# Patient Record
Sex: FEMALE | Race: OTHER | HISPANIC OR LATINO | URBAN - METROPOLITAN AREA
[De-identification: names, ages, dates, MRNs, and addresses within clinical notes are randomized per-mention and may not be internally consistent; named-entity substitution may affect disease eponyms.]

---

## 2015-12-23 RX ORDER — MIRTAZAPINE 45 MG/1
1 TABLET, ORALLY DISINTEGRATING ORAL
Qty: 0 | Refills: 0 | COMMUNITY
Start: 2015-12-23

## 2017-05-29 ENCOUNTER — INPATIENT (INPATIENT)
Facility: HOSPITAL | Age: 67
LOS: 2 days | Discharge: HOME | DRG: 69 | End: 2017-06-01
Attending: INTERNAL MEDICINE | Admitting: INTERNAL MEDICINE
Payer: MEDICARE

## 2017-05-29 VITALS
DIASTOLIC BLOOD PRESSURE: 93 MMHG | HEIGHT: 63 IN | OXYGEN SATURATION: 100 % | TEMPERATURE: 99 F | SYSTOLIC BLOOD PRESSURE: 153 MMHG | HEART RATE: 98 BPM | WEIGHT: 184.97 LBS | RESPIRATION RATE: 20 BRPM

## 2017-05-29 DIAGNOSIS — Z86.69 PERSONAL HISTORY OF OTHER DISEASES OF THE NERVOUS SYSTEM AND SENSE ORGANS: Chronic | ICD-10-CM

## 2017-05-29 DIAGNOSIS — Z29.9 ENCOUNTER FOR PROPHYLACTIC MEASURES, UNSPECIFIED: ICD-10-CM

## 2017-05-29 DIAGNOSIS — I27.82 CHRONIC PULMONARY EMBOLISM: ICD-10-CM

## 2017-05-29 DIAGNOSIS — I63.9 CEREBRAL INFARCTION, UNSPECIFIED: ICD-10-CM

## 2017-05-29 DIAGNOSIS — R20.0 ANESTHESIA OF SKIN: ICD-10-CM

## 2017-05-29 DIAGNOSIS — I10 ESSENTIAL (PRIMARY) HYPERTENSION: ICD-10-CM

## 2017-05-29 DIAGNOSIS — K42.9 UMBILICAL HERNIA WITHOUT OBSTRUCTION OR GANGRENE: Chronic | ICD-10-CM

## 2017-05-29 LAB
ALBUMIN SERPL ELPH-MCNC: 3.7 G/DL — SIGNIFICANT CHANGE UP (ref 3.5–5)
ALP SERPL-CCNC: 113 U/L — SIGNIFICANT CHANGE UP (ref 40–120)
ALT FLD-CCNC: 29 U/L DA — SIGNIFICANT CHANGE UP (ref 10–60)
ANION GAP SERPL CALC-SCNC: 6 MMOL/L — SIGNIFICANT CHANGE UP (ref 5–17)
APTT BLD: 30.5 SEC — SIGNIFICANT CHANGE UP (ref 27.5–37.4)
AST SERPL-CCNC: 31 U/L — SIGNIFICANT CHANGE UP (ref 10–40)
BASOPHILS # BLD AUTO: 0.1 K/UL — SIGNIFICANT CHANGE UP (ref 0–0.2)
BASOPHILS NFR BLD AUTO: 0.9 % — SIGNIFICANT CHANGE UP (ref 0–2)
BILIRUB SERPL-MCNC: 0.2 MG/DL — SIGNIFICANT CHANGE UP (ref 0.2–1.2)
BUN SERPL-MCNC: 25 MG/DL — HIGH (ref 7–18)
CALCIUM SERPL-MCNC: 9 MG/DL — SIGNIFICANT CHANGE UP (ref 8.4–10.5)
CHLORIDE SERPL-SCNC: 109 MMOL/L — HIGH (ref 96–108)
CO2 SERPL-SCNC: 26 MMOL/L — SIGNIFICANT CHANGE UP (ref 22–31)
CREAT SERPL-MCNC: 1.31 MG/DL — HIGH (ref 0.5–1.3)
EOSINOPHIL # BLD AUTO: 0.4 K/UL — SIGNIFICANT CHANGE UP (ref 0–0.5)
EOSINOPHIL NFR BLD AUTO: 5.3 % — SIGNIFICANT CHANGE UP (ref 0–6)
GLUCOSE SERPL-MCNC: 115 MG/DL — HIGH (ref 70–99)
HCT VFR BLD CALC: 37.4 % — SIGNIFICANT CHANGE UP (ref 34.5–45)
HGB BLD-MCNC: 11.4 G/DL — LOW (ref 11.5–15.5)
INR BLD: 1.11 RATIO — SIGNIFICANT CHANGE UP (ref 0.88–1.16)
LYMPHOCYTES # BLD AUTO: 2 K/UL — SIGNIFICANT CHANGE UP (ref 1–3.3)
LYMPHOCYTES # BLD AUTO: 29.8 % — SIGNIFICANT CHANGE UP (ref 13–44)
MAGNESIUM SERPL-MCNC: 2.1 MG/DL — SIGNIFICANT CHANGE UP (ref 1.6–2.6)
MCHC RBC-ENTMCNC: 27 PG — SIGNIFICANT CHANGE UP (ref 27–34)
MCHC RBC-ENTMCNC: 30.5 GM/DL — LOW (ref 32–36)
MCV RBC AUTO: 88.3 FL — SIGNIFICANT CHANGE UP (ref 80–100)
MONOCYTES # BLD AUTO: 0.6 K/UL — SIGNIFICANT CHANGE UP (ref 0–0.9)
MONOCYTES NFR BLD AUTO: 8.4 % — SIGNIFICANT CHANGE UP (ref 2–14)
NEUTROPHILS # BLD AUTO: 3.7 K/UL — SIGNIFICANT CHANGE UP (ref 1.8–7.4)
NEUTROPHILS NFR BLD AUTO: 55.6 % — SIGNIFICANT CHANGE UP (ref 43–77)
PLATELET # BLD AUTO: 239 K/UL — SIGNIFICANT CHANGE UP (ref 150–400)
POTASSIUM SERPL-MCNC: 4.3 MMOL/L — SIGNIFICANT CHANGE UP (ref 3.5–5.3)
POTASSIUM SERPL-SCNC: 4.3 MMOL/L — SIGNIFICANT CHANGE UP (ref 3.5–5.3)
PROT SERPL-MCNC: 8.2 G/DL — SIGNIFICANT CHANGE UP (ref 6–8.3)
PROTHROM AB SERPL-ACNC: 12.1 SEC — SIGNIFICANT CHANGE UP (ref 9.8–12.7)
RBC # BLD: 4.24 M/UL — SIGNIFICANT CHANGE UP (ref 3.8–5.2)
RBC # FLD: 13.7 % — SIGNIFICANT CHANGE UP (ref 10.3–14.5)
SODIUM SERPL-SCNC: 141 MMOL/L — SIGNIFICANT CHANGE UP (ref 135–145)
TROPONIN I SERPL-MCNC: <0.015 NG/ML — SIGNIFICANT CHANGE UP (ref 0–0.04)
WBC # BLD: 6.7 K/UL — SIGNIFICANT CHANGE UP (ref 3.8–10.5)
WBC # FLD AUTO: 6.7 K/UL — SIGNIFICANT CHANGE UP (ref 3.8–10.5)

## 2017-05-29 PROCEDURE — 70450 CT HEAD/BRAIN W/O DYE: CPT | Mod: 26

## 2017-05-29 PROCEDURE — 99291 CRITICAL CARE FIRST HOUR: CPT

## 2017-05-29 PROCEDURE — 71010: CPT | Mod: 26

## 2017-05-29 RX ORDER — LEVOTHYROXINE SODIUM 125 MCG
1 TABLET ORAL
Qty: 0 | Refills: 0 | COMMUNITY

## 2017-05-29 RX ORDER — ATORVASTATIN CALCIUM 80 MG/1
40 TABLET, FILM COATED ORAL AT BEDTIME
Qty: 0 | Refills: 0 | Status: DISCONTINUED | OUTPATIENT
Start: 2017-05-29 | End: 2017-06-01

## 2017-05-29 RX ORDER — LEVOTHYROXINE SODIUM 125 MCG
25 TABLET ORAL DAILY
Qty: 0 | Refills: 0 | Status: DISCONTINUED | OUTPATIENT
Start: 2017-05-29 | End: 2017-06-01

## 2017-05-29 RX ORDER — OLANZAPINE 15 MG/1
1 TABLET, FILM COATED ORAL
Qty: 0 | Refills: 0 | COMMUNITY

## 2017-05-29 RX ORDER — CITALOPRAM 10 MG/1
40 TABLET, FILM COATED ORAL DAILY
Qty: 0 | Refills: 0 | Status: DISCONTINUED | OUTPATIENT
Start: 2017-05-29 | End: 2017-06-01

## 2017-05-29 RX ORDER — ASPIRIN/CALCIUM CARB/MAGNESIUM 324 MG
325 TABLET ORAL DAILY
Qty: 0 | Refills: 0 | Status: DISCONTINUED | OUTPATIENT
Start: 2017-05-29 | End: 2017-05-30

## 2017-05-29 RX ORDER — APIXABAN 2.5 MG/1
5 TABLET, FILM COATED ORAL
Qty: 0 | Refills: 0 | Status: DISCONTINUED | OUTPATIENT
Start: 2017-05-29 | End: 2017-06-01

## 2017-05-29 RX ORDER — OLANZAPINE 15 MG/1
5 TABLET, FILM COATED ORAL AT BEDTIME
Qty: 0 | Refills: 0 | Status: DISCONTINUED | OUTPATIENT
Start: 2017-05-29 | End: 2017-06-01

## 2017-05-29 RX ORDER — ALBUTEROL 90 UG/1
2 AEROSOL, METERED ORAL EVERY 6 HOURS
Qty: 0 | Refills: 0 | Status: DISCONTINUED | OUTPATIENT
Start: 2017-05-29 | End: 2017-06-01

## 2017-05-29 RX ORDER — MIRTAZAPINE 45 MG/1
15 TABLET, ORALLY DISINTEGRATING ORAL AT BEDTIME
Qty: 0 | Refills: 0 | Status: DISCONTINUED | OUTPATIENT
Start: 2017-05-29 | End: 2017-05-30

## 2017-05-29 RX ADMIN — ATORVASTATIN CALCIUM 40 MILLIGRAM(S): 80 TABLET, FILM COATED ORAL at 21:36

## 2017-05-29 RX ADMIN — OLANZAPINE 5 MILLIGRAM(S): 15 TABLET, FILM COATED ORAL at 21:36

## 2017-05-29 RX ADMIN — MIRTAZAPINE 15 MILLIGRAM(S): 45 TABLET, ORALLY DISINTEGRATING ORAL at 21:36

## 2017-05-29 RX ADMIN — Medication 325 MILLIGRAM(S): at 21:36

## 2017-05-29 NOTE — H&P ADULT - NEGATIVE MUSCULOSKELETAL SYMPTOMS
no arthritis/no muscle cramps/no muscle weakness/no myalgia/no joint swelling/no stiffness/no arthralgia/no neck pain

## 2017-05-29 NOTE — H&P ADULT - NEGATIVE CARDIOVASCULAR SYMPTOMS
no orthopnea/no paroxysmal nocturnal dyspnea/no palpitations/no claudication/no chest pain/no peripheral edema

## 2017-05-29 NOTE — ED PROVIDER NOTE - NS ED MD SCRIBE ATTENDING SCRIBE SECTIONS
HISTORY OF PRESENT ILLNESS/DISPOSITION/PAST MEDICAL/SURGICAL/SOCIAL HISTORY/VITAL SIGNS( Pullset)/HIV/REVIEW OF SYSTEMS/PHYSICAL EXAM

## 2017-05-29 NOTE — H&P ADULT - HISTORY OF PRESENT ILLNESS
66 y/o F from home with daughter, walks with a cane and walker. 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       Labs: ; BUN/Cr 21/1.48; AST/ALT 93/83; lactate 2.6   UA: negative  CXR: mild right base opacity   zithromax/rocephin x 1, 2L bolus NS, duoneb, 2 mg Mg, solumedrol 80 given in ED.    Admited for respiratory failure with hypoxia, likely 2/2  PE.      1. Respiratory failure with hypoxia sec to PE:  - tachypneic to 26, tachycardic to 130 normal sinus; elevated lactate of 2.6  - WELLS score of 9 (clinical signs,#1 diagnosis, hx of PE)  -Ddimer elevated - 7488  -CTA- showed PE in both pulmonary arteries.  -CXR- w/ mild right base opacity   -anticoagulation with heparin infusion (no lovenox as pt had SYLVIA) and warfarin bridging as INR was subtherapeutic.  -rapid flu negative  -supplemental Oxygen via NC  - Hem oncology Dr. Montesinos consulted. Pt s INR 1.6 today, started on eliquis 10mg BID, to continue fro 1 week and 5mg BID from 2nd week for 1 year. Pt adviswed to f/u Hem onco as outpt for hypercoagulability workup.     2.  SYLVIA (acute kidney injury).  Plan: -SYLVIA on likely CKD (baseline creatinine ~1.2) - .- likely 2/2 dehydration 2/2 decreased PO intake, resolved with IVF     3.  Transaminitis- resolved. Hepatitis panel negative.    4.  Normocytic anemia mild normocytic anemia- likely multifactorial - nutrition deficiency.    5.  HTN (hypertension -currently not on antihypertensives  -trending BP- start medical management as needed.     6.  CVA (cerebral vascular accident).  Plan: hx Right PCA CVA 12/15  continue aspirin, statin.     7.  Asthma- not in acute exacerbation- on duoneb, supplemental oxygen PRN.     Pt is stable to be discharged home as per Medical Attending. 64 y/o F from home with daughter, walks with a cane and walker. 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 Pt presents to ED complaining sudden onset of numbness to the face x today 14:30hrs. Pt reports numbness initially was intermittent but now has constant numbness. Pt states numbness episode today was similar to prior stroke 2 years ago (2015); since then, pt was diagnosed with PE and is currently taking Eliquis. Pt's last Eliquis dosage was this morning 9:00AM. Pt denies fever, chills, CP, SOB, cough, palpitations, or any other complaints.     Stress test: 5 y/a WNL 66 y/o F from home with daughter, walks with a cane and walker. 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 Pt presents to ED complaining sudden onset of numbness to the face x today 14:30hrs. Pt reports numbness initially was intermittent but now has constant numbness. Pt states numbness episode today was similar to prior stroke 2 years ago (2015); since then, pt was diagnosed with PE and is currently taking Eliquis. Pt's last Eliquis dosage was this morning 9:00AM. Pt denies fever, chills, CP, SOB, cough, palpitations, or any other complaints.     Stress test: 5 y/a WNL

## 2017-05-29 NOTE — H&P ADULT - NEGATIVE GASTROINTESTINAL SYMPTOMS
no flatulence/no vomiting/no change in bowel habits/no hematochezia/no hiccoughs/no jaundice/no melena/no nausea/no diarrhea/no constipation/no abdominal pain/no steatorrhea

## 2017-05-29 NOTE — H&P ADULT - GASTROINTESTINAL DETAILS
no masses palpable/soft/normal/no guarding/no bruit/no organomegaly/no rebound tenderness/no rigidity/nontender/no distention/bowel sounds normal

## 2017-05-29 NOTE — ED PROVIDER NOTE - MEDICAL DECISION MAKING DETAILS
66 y/o F pt with prior CVA, here for numbness to face sudden onset at 14:20 and currently taking Eliquis. Will wait for CT head and reassess. Anticipate admission for MRI.

## 2017-05-29 NOTE — ED PROVIDER NOTE - OBJECTIVE STATEMENT
68 y/o F pt with PMHx of CVA, HTN, PE, presents to ED c/o sudden onset of numbness to the face x today (14:30). Pt reports numbness initially was intermittent but now has constant numbness. Pt states numbness episode today was similar to prior stroke 2 years ago (2015); since then, pt was diagnosed with PE and is currently taking Eliquis. Pt's last Eliquis dosage was this morning (09:00). Pt denies fever, chills, CP, SOB, cough, palpitations, or any other complaints. NKDA.

## 2017-05-29 NOTE — H&P ADULT - NEGATIVE ENMT SYMPTOMS
no dry mouth/no sinus symptoms/no hearing difficulty/no nasal discharge/no ear pain/no tinnitus/no nose bleeds/no nasal obstruction/no nasal congestion/no gum bleeding/no recurrent cold sores/no post-nasal discharge/no abnormal taste sensation

## 2017-05-29 NOTE — ED ADULT NURSE NOTE - OBJECTIVE STATEMENT
axox3 ,nad ,[place]an speaking walked in c/o left side weakness ( had stroke in 2015 ) today facial numbness only

## 2017-05-29 NOTE — H&P ADULT - PROBLEM SELECTOR PLAN 1
NIH score of 2-3 (left facial numbness and left hand + arm numbness)  pt out of TPA window since admission and high risk for AC   pt has been off atorvastatin for the past 6 months, pt was told lipid profile WNL   CT head negative  pt passed bedside speech and swallow evaluation   c/w permissive hypertensive period for 24-48hrs  c/w ASA, statin   f/u Brain MRI / MRA  f/u carotid doppler   f/u echocardiogram   f/u lipid profile   f/u PT evaluation   Neurology consulted Dr. Escalante

## 2017-05-29 NOTE — H&P ADULT - NEGATIVE NEUROLOGICAL SYMPTOMS
no generalized seizures/no facial palsy/no hemiparesis/no paresthesias/no focal seizures/no tremors/no loss of consciousness/no vertigo/no syncope/no headache/no confusion

## 2017-05-29 NOTE — H&P ADULT - ATTENDING COMMENTS
Patient seen and examined admitted overnight to Dr. Kent    HPI:  66 y/o F from home with daughter, walks with a cane and walker. 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 Pt presents to ED complaining sudden onset of numbness to the face x today 14:30hrs. Pt reports numbness initially was intermittent but now has constant numbness. Pt states numbness episode today was similar to prior stroke 2 years ago (2015); since then, pt was diagnosed with PE and is currently taking Eliquis. Pt's last Eliquis dosage was this morning 9:00AM. Pt denies fever, chills, CP, SOB, cough, palpitations, or any other complaints.     Stress test: 5 y/a WNL (29 May 2017 20:30)      ROS:     Allergies: No Known Allergies      FAMILY HISTORY: No pertinent family history in first degree relatives      Vital Signs Last 24 Hrs  T(C): 36.9, Max: 37 (05-29 @ 18:08)  T(F): 98.4, Max: 98.6 (05-29 @ 18:08)  HR: 94 (83 - 98)  BP: 140/77 (132/72 - 153/93)  RR: 16 (16 - 20)  SpO2: 97% (95% - 100%)    P/E: As per MAR above    Labs Reviewed:                         11.5   4.8   )-----------( 186      ( 30 May 2017 05:46 )             35.7     05-30    142  |  111<H>  |  19<H>  ----------------------------<  108<H>  4.0   |  23  |  1.06    Ca    8.5      30 May 2017 05:46  Phos  3.1     05-30  Mg     2.3     05-30    TPro  7.4  /  Alb  3.5  /  TBili  0.3  /  DBili  x   /  AST  22  /  ALT  24  /  AlkPhos  96  05-30    CARDIAC MARKERS ( 30 May 2017 05:46 )  <0.015 ng/mL / x     / 277 U/L / x     / 2.0 ng/mL  CARDIAC MARKERS ( 29 May 2017 23:46 )  <0.015 ng/mL / x     / 319 U/L / x     / 2.6 ng/mL  CARDIAC MARKERS ( 29 May 2017 18:45 )  <0.015 ng/mL / x     / x     / x     / x        PT/INR - ( 29 May 2017 19:23 )   PT: 12.1 sec;   INR: 1.11 ratio    PTT - ( 29 May 2017 19:23 )  PTT:30.5 sec    MEDICATIONS  (STANDING):  aspirin 325milliGRAM(s) Oral daily  apixaban 5milliGRAM(s) Oral two times a day  citalopram 40milliGRAM(s) Oral daily  mirtazapine 15milliGRAM(s) Oral at bedtime  atorvastatin 40milliGRAM(s) Oral at bedtime  levothyroxine 25MICROGram(s) Oral daily  OLANZapine 5milliGRAM(s) Oral at bedtime  busPIRone 15milliGRAM(s) Oral two times a day    MEDICATIONS  (PRN):  ALBUTerol    90 MICROgram(s) HFA Inhaler 2Puff(s) Inhalation every 6 hours PRN Shortness of Breath and/or Wheezing      CXR reviewed:     EKG Reviewed:     D/D:      A/P:  Admit to Tele; Serial Cardiac enzymes; 2 DEcho;   Continue Apixaban; Ecotrin 81mg daily;   Follow up MRI Brain; MRA Head and Neck;   IVF  Hold Lisinopril for permissive HTN in case CVA; If negative MRI will resume if BP elevated; BP low at present  Neuro evaluation Dr. Escalante      Discussed with Patient. Patient seen and examined admitted overnight to Dr. Kent    HPI:  64 y/o F from home with daughter, walks with a cane and walker. 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 Pt presents to ED complaining sudden onset of numbness to the face x today 14:30hrs. Pt reports numbness initially was intermittent but now has constant numbness. Pt states numbness episode today was similar to prior stroke 2 years ago (2015); since then, pt was diagnosed with PE and is currently taking Eliquis. Pt's last Eliquis dosage was this morning 9:00AM. Pt denies fever, chills, CP, SOB, cough, palpitations, or any other complaints.     Stress test: 5 y/a WNL (29 May 2017 20:30)      ROS: c/o throat pain; numbness face; no abdominal pain, chest pain, SOB, cough, fever, headache; has leg pains; Follows commands; able to move all four extremities; Other ROS Negative    Allergies: No Known Allergies    SH: Lives with daughter; Walks with walker; Non smoker  FAMILY HISTORY: No pertinent family history in first degree relatives      Vital Signs Last 24 Hrs  T(C): 36.9, Max: 37 (05-29 @ 18:08)  T(F): 98.4, Max: 98.6 (05-29 @ 18:08)  HR: 94 (83 - 98)  BP: 140/77 (132/72 - 153/93)  RR: 16 (16 - 20)  SpO2: 97% (95% - 100%)    P/E: As per MAR above  Neuro: AAO x 3; No gross focal deficits; Power 5/5 B/L UE; 4/5 B/L LE symmetrically (chronic likely)  Neck: Tenderness on palpation in submandibular area.  CVS: S1S2 present, Regular  Resp: BLAE+; No wheeze or Rhonchi  GI: Soft, Obese, BS+, Non tender  Extr: No edema B/L LE; Mild calf tenderness; No cyanosis    Labs Reviewed:                         11.5   4.8   )-----------( 186      ( 30 May 2017 05:46 )             35.7     05-30    142  |  111<H>  |  19<H>  ----------------------------<  108<H>  4.0   |  23  |  1.06    Ca    8.5      30 May 2017 05:46  Phos  3.1     05-30  Mg     2.3     05-30    TPro  7.4  /  Alb  3.5  /  TBili  0.3  /  DBili  x   /  AST  22  /  ALT  24  /  AlkPhos  96  05-30    CARDIAC MARKERS ( 30 May 2017 05:46 )  <0.015 ng/mL / x     / 277 U/L / x     / 2.0 ng/mL  CARDIAC MARKERS ( 29 May 2017 23:46 )  <0.015 ng/mL / x     / 319 U/L / x     / 2.6 ng/mL  CARDIAC MARKERS ( 29 May 2017 18:45 )  <0.015 ng/mL / x     / x     / x     / x        PT/INR - ( 29 May 2017 19:23 )   PT: 12.1 sec;   INR: 1.11 ratio    PTT - ( 29 May 2017 19:23 )  PTT:30.5 sec    MEDICATIONS  (STANDING):  aspirin 325milliGRAM(s) Oral daily  apixaban 5milliGRAM(s) Oral two times a day  citalopram 40milliGRAM(s) Oral daily  mirtazapine 15milliGRAM(s) Oral at bedtime  atorvastatin 40milliGRAM(s) Oral at bedtime  levothyroxine 25MICROGram(s) Oral daily  OLANZapine 5milliGRAM(s) Oral at bedtime  busPIRone 15milliGRAM(s) Oral two times a day    MEDICATIONS  (PRN):  ALBUTerol    90 MICROgram(s) HFA Inhaler 2Puff(s) Inhalation every 6 hours PRN Shortness of Breath and/or Wheezing      CXR reviewed: FINDINGS:  No focal lung consolidation. No pneumothorax or pleural effusion.   The cardiac silhouette is within normal limits in size.     EKG Reviewed: Sinus Rhythm; No acute ST, T changes    D/D:  Facial Numbness with Hx CVA concerning for CVA  HTN  Hx PE on anticoagulation        A/P:  Admit to Tele; Serial Cardiac enzymes; 2 DEcho;   Continue Apixaban; Ecotrin 81mg daily;   Follow up MRI Brain; MRA Head and Neck;   Hold Lisinopril for permissive HTN in case CVA; If negative MRI will resume if BP elevated; BP low at present  Neuro evaluation Dr. Escalante  Patient with no evidence of Dysphagia; Continue DASH diet.   PT evaluation; Ambulates with walker at baseline      Discussed with Patient. Patient seen and examined admitted overnight to Dr. Kent    HPI:  64 y/o F from home with daughter, walks with a cane and walker. 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 Pt presents to ED complaining sudden onset of numbness to the face x today 14:30hrs. Pt reports numbness initially was intermittent but now has constant numbness. Pt states numbness episode today was similar to prior stroke 2 years ago (2015); since then, pt was diagnosed with PE and is currently taking Eliquis. Pt's last Eliquis dosage was this morning 9:00AM. Pt denies fever, chills, CP, SOB, cough, palpitations, or any other complaints.     Stress test: 5 y/a WNL (29 May 2017 20:30)      ROS: c/o throat pain; numbness face; no abdominal pain, chest pain, SOB, cough, fever, headache; has leg pains; Follows commands; able to move all four extremities; Other ROS Negative    Allergies: No Known Allergies    SH: Lives with daughter; Walks with walker; Non smoker  FAMILY HISTORY: No pertinent family history in first degree relatives      Vital Signs Last 24 Hrs  T(C): 36.9, Max: 37 (05-29 @ 18:08)  T(F): 98.4, Max: 98.6 (05-29 @ 18:08)  HR: 94 (83 - 98)  BP: 140/77 (132/72 - 153/93)  RR: 16 (16 - 20)  SpO2: 97% (95% - 100%)    P/E: As per MAR above  Neuro: AAO x 3; No gross focal deficits; Power 5/5 B/L UE; 4/5 B/L LE symmetrically (chronic likely)  Neck: Tenderness on palpation in submandibular area.  CVS: S1S2 present, Regular  Resp: BLAE+; No wheeze or Rhonchi  GI: Soft, Obese, BS+, Non tender  Extr: No edema B/L LE; Mild calf tenderness; No cyanosis    Labs Reviewed:                         11.5   4.8   )-----------( 186      ( 30 May 2017 05:46 )             35.7     05-30    142  |  111<H>  |  19<H>  ----------------------------<  108<H>  4.0   |  23  |  1.06    Ca    8.5      30 May 2017 05:46  Phos  3.1     05-30  Mg     2.3     05-30    TPro  7.4  /  Alb  3.5  /  TBili  0.3  /  DBili  x   /  AST  22  /  ALT  24  /  AlkPhos  96  05-30    CARDIAC MARKERS ( 30 May 2017 05:46 )  <0.015 ng/mL / x     / 277 U/L / x     / 2.0 ng/mL  CARDIAC MARKERS ( 29 May 2017 23:46 )  <0.015 ng/mL / x     / 319 U/L / x     / 2.6 ng/mL  CARDIAC MARKERS ( 29 May 2017 18:45 )  <0.015 ng/mL / x     / x     / x     / x        PT/INR - ( 29 May 2017 19:23 )   PT: 12.1 sec;   INR: 1.11 ratio    PTT - ( 29 May 2017 19:23 )  PTT:30.5 sec    MEDICATIONS  (STANDING):  aspirin 325milliGRAM(s) Oral daily  apixaban 5milliGRAM(s) Oral two times a day  citalopram 40milliGRAM(s) Oral daily  mirtazapine 15milliGRAM(s) Oral at bedtime  atorvastatin 40milliGRAM(s) Oral at bedtime  levothyroxine 25MICROGram(s) Oral daily  OLANZapine 5milliGRAM(s) Oral at bedtime  busPIRone 15milliGRAM(s) Oral two times a day    MEDICATIONS  (PRN):  ALBUTerol    90 MICROgram(s) HFA Inhaler 2Puff(s) Inhalation every 6 hours PRN Shortness of Breath and/or Wheezing      CXR reviewed: FINDINGS:  No focal lung consolidation. No pneumothorax or pleural effusion.   The cardiac silhouette is within normal limits in size.     EKG Reviewed: Sinus Rhythm; No acute ST, T changes    D/D:  Facial Numbness with Hx CVA concerning for CVA  Prediabetes  Hypertriglyceredemia  HTN  Hx PE on anticoagulation        A/P:  Admit to Tele; Serial Cardiac enzymes; 2 DEcho;   Continue Apixaban; Ecotrin 81mg daily; Statin;   Follow up MRI Brain; MRA Head and Neck; Neuro evaluation Dr. Escalante.  Hold Lisinopril for permissive HTN in case CVA; If negative MRI will resume if BP elevated; BP low at present  Add Lopid 600 mg twice daily for elevated TG; Goal less than 150; Diet and exercise;     Patient with no evidence of Dysphagia; Continue DASH diet.   PT evaluation; Ambulates with walker at baseline    Discussed with Patient. Patient seen and examined admitted overnight to Dr. Kent    HPI:  66 y/o F from home with daughter, walks with a cane and walker. 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 Pt presents to ED complaining sudden onset of numbness to the face x today 14:30hrs. Pt reports numbness initially was intermittent but now has constant numbness. Pt states numbness episode today was similar to prior stroke 2 years ago (2015); since then, pt was diagnosed with PE and is currently taking Eliquis. Pt's last Eliquis dosage was this morning 9:00AM. Pt denies fever, chills, CP, SOB, cough, palpitations, or any other complaints.     Stress test: 5 y/a WNL (29 May 2017 20:30)      ROS: c/o throat pain; numbness face; no abdominal pain, chest pain, SOB, cough, fever, headache; has leg pains; Follows commands; able to move all four extremities; Other ROS Negative    Allergies: No Known Allergies    SH: Lives with daughter; Walks with walker; Non smoker  FAMILY HISTORY: No pertinent family history in first degree relatives      Vital Signs Last 24 Hrs  T(C): 36.9, Max: 37 (05-29 @ 18:08)  T(F): 98.4, Max: 98.6 (05-29 @ 18:08)  HR: 94 (83 - 98)  BP: 140/77 (132/72 - 153/93)  RR: 16 (16 - 20)  SpO2: 97% (95% - 100%)    P/E: As per MAR above  Neuro: AAO x 3; No gross focal deficits; Power 5/5 B/L UE; 4/5 B/L LE symmetrically (chronic likely)  Neck: Tenderness on palpation in submandibular area.  CVS: S1S2 present, Regular  Resp: BLAE+; No wheeze or Rhonchi  GI: Soft, Obese, BS+, Non tender  Extr: No edema B/L LE; Mild calf tenderness; No cyanosis    Labs Reviewed:                         11.5   4.8   )-----------( 186      ( 30 May 2017 05:46 )             35.7     05-30    142  |  111<H>  |  19<H>  ----------------------------<  108<H>  4.0   |  23  |  1.06    Ca    8.5      30 May 2017 05:46  Phos  3.1     05-30  Mg     2.3     05-30    TPro  7.4  /  Alb  3.5  /  TBili  0.3  /  DBili  x   /  AST  22  /  ALT  24  /  AlkPhos  96  05-30    CARDIAC MARKERS ( 30 May 2017 05:46 )  <0.015 ng/mL / x     / 277 U/L / x     / 2.0 ng/mL  CARDIAC MARKERS ( 29 May 2017 23:46 )  <0.015 ng/mL / x     / 319 U/L / x     / 2.6 ng/mL  CARDIAC MARKERS ( 29 May 2017 18:45 )  <0.015 ng/mL / x     / x     / x     / x        PT/INR - ( 29 May 2017 19:23 )   PT: 12.1 sec;   INR: 1.11 ratio    PTT - ( 29 May 2017 19:23 )  PTT:30.5 sec    MEDICATIONS  (STANDING):  aspirin 325milliGRAM(s) Oral daily  apixaban 5milliGRAM(s) Oral two times a day  citalopram 40milliGRAM(s) Oral daily  mirtazapine 15milliGRAM(s) Oral at bedtime  atorvastatin 40milliGRAM(s) Oral at bedtime  levothyroxine 25MICROGram(s) Oral daily  OLANZapine 5milliGRAM(s) Oral at bedtime  busPIRone 15milliGRAM(s) Oral two times a day    MEDICATIONS  (PRN):  ALBUTerol    90 MICROgram(s) HFA Inhaler 2Puff(s) Inhalation every 6 hours PRN Shortness of Breath and/or Wheezing      CXR reviewed: FINDINGS:  No focal lung consolidation. No pneumothorax or pleural effusion.   The cardiac silhouette is within normal limits in size.     EKG Reviewed: Sinus Rhythm; No acute ST, T changes    D/D:  Facial Numbness with Hx CVA concerning for CVA  Prediabetes  Hypertriglyceredemia  HTN  Hx PE on anticoagulation        A/P:  Admit to Tele; Serial Cardiac enzymes; 2 DEcho;   Continue Apixaban; Ecotrin decrease to 81 mg daily; Resume Statin;   Follow up MRI Brain; MRA Head and Neck; Neuro evaluation Dr. Escalante.  Hold Lisinopril for permissive HTN in case CVA; If negative MRI will resume if BP elevated; BP low at present  Add Lopid 600 mg twice daily for elevated TG; Goal less than 150; Diet and exercise;     Patient with no evidence of Dysphagia; Continue DASH diet.   PT evaluation; Ambulates with walker at baseline    Discussed with Patient. Patient seen and examined admitted overnight to Dr. Kent    HPI:  64 y/o F from home with daughter, walks with a cane and walker. 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 Pt presents to ED complaining sudden onset of numbness to the face x today 14:30hrs. Pt reports numbness initially was intermittent but now has constant numbness. Pt states numbness episode today was similar to prior stroke 2 years ago (2015); since then, pt was diagnosed with PE and is currently taking Eliquis. Pt's last Eliquis dosage was this morning 9:00AM. Pt denies fever, chills, CP, SOB, cough, palpitations, or any other complaints.     Stress test: 5 y/a WNL (29 May 2017 20:30)      ROS: c/o throat pain; numbness face; no abdominal pain, chest pain, SOB, cough, fever, headache; has leg pains; Follows commands; able to move all four extremities; Other ROS Negative    Allergies: No Known Allergies    SH: Lives with daughter; Walks with walker; Non smoker  FAMILY HISTORY: No pertinent family history in first degree relatives      Vital Signs Last 24 Hrs  T(C): 36.9, Max: 37 (05-29 @ 18:08)  T(F): 98.4, Max: 98.6 (05-29 @ 18:08)  HR: 94 (83 - 98)  BP: 140/77 (132/72 - 153/93)  RR: 16 (16 - 20)  SpO2: 97% (95% - 100%)    P/E: As per MAR above  Neuro: AAO x 3; No gross focal deficits; Power 5/5 B/L UE; 4/5 B/L LE symmetrically (chronic likely)  Neck: Tenderness on palpation in submandibular area.  CVS: S1S2 present, Regular  Resp: BLAE+; No wheeze or Rhonchi  GI: Soft, Obese, BS+, Non tender  Extr: No edema B/L LE; Mild calf tenderness; No cyanosis    Labs Reviewed:                         11.5   4.8   )-----------( 186      ( 30 May 2017 05:46 )             35.7     05-30    142  |  111<H>  |  19<H>  ----------------------------<  108<H>  4.0   |  23  |  1.06    Ca    8.5      30 May 2017 05:46  Phos  3.1     05-30  Mg     2.3     05-30    TPro  7.4  /  Alb  3.5  /  TBili  0.3  /  DBili  x   /  AST  22  /  ALT  24  /  AlkPhos  96  05-30    CARDIAC MARKERS ( 30 May 2017 05:46 )  <0.015 ng/mL / x     / 277 U/L / x     / 2.0 ng/mL  CARDIAC MARKERS ( 29 May 2017 23:46 )  <0.015 ng/mL / x     / 319 U/L / x     / 2.6 ng/mL  CARDIAC MARKERS ( 29 May 2017 18:45 )  <0.015 ng/mL / x     / x     / x     / x        PT/INR - ( 29 May 2017 19:23 )   PT: 12.1 sec;   INR: 1.11 ratio    PTT - ( 29 May 2017 19:23 )  PTT:30.5 sec    MEDICATIONS  (STANDING):  aspirin 325milliGRAM(s) Oral daily  apixaban 5milliGRAM(s) Oral two times a day  citalopram 40milliGRAM(s) Oral daily  mirtazapine 15milliGRAM(s) Oral at bedtime  atorvastatin 40milliGRAM(s) Oral at bedtime  levothyroxine 25MICROGram(s) Oral daily  OLANZapine 5milliGRAM(s) Oral at bedtime  busPIRone 15milliGRAM(s) Oral two times a day    MEDICATIONS  (PRN):  ALBUTerol    90 MICROgram(s) HFA Inhaler 2Puff(s) Inhalation every 6 hours PRN Shortness of Breath and/or Wheezing      CXR reviewed: FINDINGS:  No focal lung consolidation. No pneumothorax or pleural effusion.   The cardiac silhouette is within normal limits in size.     EKG Reviewed: Sinus Rhythm; No acute ST, T changes    D/D:  Facial Numbness with Hx CVA concerning for CVA  Prediabetes  Hypertriglyceredemia  HTN  Hx PE on anticoagulation  Hx Depression/ Hypothyroidism    A/P:  Admit to Tele; Serial Cardiac enzymes; 2 DEcho;   Continue Apixaban; Ecotrin decrease to 81 mg daily; Resume Statin;   Follow up MRI Brain; MRA Head and Neck; Neuro evaluation Dr. Escalante.  Hold Lisinopril for permissive HTN in case CVA; If negative MRI will resume if BP elevated; BP low at present  Add Lopid 600 mg twice daily for elevated TG; Goal less than 150; Diet and exercise;   Continue Synthroid; F/U TSH; Continue Citalopram and Mirtazapine.    Patient with no evidence of Dysphagia; Continue DASH diet.   PT evaluation; Ambulates with walker at baseline    Discussed with Patient. Patient seen and examined admitted overnight to Dr. Kent    HPI:  64 y/o F from home with daughter, walks with a cane and walker. 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 Pt presents to ED complaining sudden onset of numbness to the face x today 14:30hrs. Pt reports numbness initially was intermittent but now has constant numbness. Pt states numbness episode today was similar to prior stroke 2 years ago (2015); since then, pt was diagnosed with PE and is currently taking Eliquis. Pt's last Eliquis dosage was this morning 9:00AM. Pt denies fever, chills, CP, SOB, cough, palpitations, or any other complaints.     Stress test: 5 y/a WNL (29 May 2017 20:30)      ROS: c/o throat pain; numbness face; no abdominal pain, chest pain, SOB, cough, fever, headache; has leg pains; Follows commands; able to move all four extremities; Other ROS Negative    Allergies: No Known Allergies    SH: Lives with daughter; Walks with walker; Non smoker  FAMILY HISTORY: No pertinent family history in first degree relatives      Vital Signs Last 24 Hrs  T(C): 36.9, Max: 37 (05-29 @ 18:08)  T(F): 98.4, Max: 98.6 (05-29 @ 18:08)  HR: 94 (83 - 98)  BP: 140/77 (132/72 - 153/93)  RR: 16 (16 - 20)  SpO2: 97% (95% - 100%)    P/E: As per MAR above  Neuro: AAO x 3; No gross focal deficits; Power 5/5 B/L UE; 4/5 B/L LE symmetrically (chronic likely)  Neck: Tenderness on palpation in submandibular area.  CVS: S1S2 present, Regular  Resp: BLAE+; No wheeze or Rhonchi  GI: Soft, Obese, BS+, Non tender  Extr: No edema B/L LE; Mild calf tenderness; No cyanosis    Labs Reviewed:                         11.5   4.8   )-----------( 186      ( 30 May 2017 05:46 )             35.7     05-30    142  |  111<H>  |  19<H>  ----------------------------<  108<H>  4.0   |  23  |  1.06    Ca    8.5      30 May 2017 05:46  Phos  3.1     05-30  Mg     2.3     05-30    TPro  7.4  /  Alb  3.5  /  TBili  0.3  /  DBili  x   /  AST  22  /  ALT  24  /  AlkPhos  96  05-30    CARDIAC MARKERS ( 30 May 2017 05:46 )  <0.015 ng/mL / x     / 277 U/L / x     / 2.0 ng/mL  CARDIAC MARKERS ( 29 May 2017 23:46 )  <0.015 ng/mL / x     / 319 U/L / x     / 2.6 ng/mL  CARDIAC MARKERS ( 29 May 2017 18:45 )  <0.015 ng/mL / x     / x     / x     / x        PT/INR - ( 29 May 2017 19:23 )   PT: 12.1 sec;   INR: 1.11 ratio    PTT - ( 29 May 2017 19:23 )  PTT:30.5 sec    MEDICATIONS  (STANDING):  aspirin 325milliGRAM(s) Oral daily  apixaban 5milliGRAM(s) Oral two times a day  citalopram 40milliGRAM(s) Oral daily  mirtazapine 15milliGRAM(s) Oral at bedtime  atorvastatin 40milliGRAM(s) Oral at bedtime  levothyroxine 25MICROGram(s) Oral daily  OLANZapine 5milliGRAM(s) Oral at bedtime  busPIRone 15milliGRAM(s) Oral two times a day    MEDICATIONS  (PRN):  ALBUTerol    90 MICROgram(s) HFA Inhaler 2Puff(s) Inhalation every 6 hours PRN Shortness of Breath and/or Wheezing      CXR reviewed: FINDINGS:  No focal lung consolidation. No pneumothorax or pleural effusion.   The cardiac silhouette is within normal limits in size.     EKG Reviewed: Sinus Rhythm; No acute ST, T changes    D/D:  Facial Numbness with Hx CVA concerning for CVA  Prediabetes  Hypertriglyceredemia  HTN  Hx PE on anticoagulation  Hx Depression/ Hypothyroidism    A/P:  Admit to Tele; Serial Cardiac enzymes; 2 DEcho;   Continue Apixaban; Ecotrin decrease to 81 mg daily; Resume Statin;   Follow up MRI Brain; MRA Head and Neck; Neuro evaluation Dr. Escalante.  Hold Lisinopril for permissive HTN in case CVA; If negative MRI will resume if BP elevated; BP low at present  Add Lopid 600 mg twice daily for elevated TG; Goal less than 150; Diet and exercise;   Continue Synthroid; F/U TSH;   NP Odalys d/w daughter confirms all Psych meds; Continue Citalopram, Klonopin, Buspar and Mirtazapine which was recently increased to 30mg bedtime by Outpatient Psychiatrist NATALYA consultation on [facility].   Patient with no evidence of Dysphagia; Continue DASH diet.   PT evaluation; Ambulates with walker at baseline    Discussed with Patient. Patient seen and examined admitted overnight to Dr. Kent    HPI:  64 y/o F from home with daughter, walks with a cane and walker. 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 Pt presents to ED complaining sudden onset of numbness to the face x today 14:30hrs. Pt reports numbness initially was intermittent but now has constant numbness. Pt states numbness episode today was similar to prior stroke 2 years ago (2015); since then, pt was diagnosed with PE and is currently taking Eliquis. Pt's last Eliquis dosage was this morning 9:00AM. Pt denies fever, chills, CP, SOB, cough, palpitations, or any other complaints.     Stress test: 5 y/a WNL (29 May 2017 20:30)      ROS: c/o throat pain; numbness face; no abdominal pain, chest pain, SOB, cough, fever, headache; has leg pains; Follows commands; able to move all four extremities; Other ROS Negative    Allergies: No Known Allergies    SH: Lives with daughter; Walks with walker; Non smoker  FAMILY HISTORY: No pertinent family history in first degree relatives      Vital Signs Last 24 Hrs  T(C): 36.9, Max: 37 (05-29 @ 18:08)  T(F): 98.4, Max: 98.6 (05-29 @ 18:08)  HR: 94 (83 - 98)  BP: 140/77 (132/72 - 153/93)  RR: 16 (16 - 20)  SpO2: 97% (95% - 100%)    P/E: As per MAR above  Neuro: AAO x 3; No gross focal deficits; Power 5/5 B/L UE; 4/5 B/L LE symmetrically (chronic likely)  Neck: Tenderness on palpation in submandibular area.  CVS: S1S2 present, Regular  Resp: BLAE+; No wheeze or Rhonchi  GI: Soft, Obese, BS+, Non tender  Extr: No edema B/L LE; Mild calf tenderness; No cyanosis    Labs Reviewed:                         11.5   4.8   )-----------( 186      ( 30 May 2017 05:46 )             35.7     05-30    142  |  111<H>  |  19<H>  ----------------------------<  108<H>  4.0   |  23  |  1.06    Ca    8.5      30 May 2017 05:46  Phos  3.1     05-30  Mg     2.3     05-30    TPro  7.4  /  Alb  3.5  /  TBili  0.3  /  DBili  x   /  AST  22  /  ALT  24  /  AlkPhos  96  05-30    CARDIAC MARKERS ( 30 May 2017 05:46 )  <0.015 ng/mL / x     / 277 U/L / x     / 2.0 ng/mL  CARDIAC MARKERS ( 29 May 2017 23:46 )  <0.015 ng/mL / x     / 319 U/L / x     / 2.6 ng/mL  CARDIAC MARKERS ( 29 May 2017 18:45 )  <0.015 ng/mL / x     / x     / x     / x        PT/INR - ( 29 May 2017 19:23 )   PT: 12.1 sec;   INR: 1.11 ratio    PTT - ( 29 May 2017 19:23 )  PTT:30.5 sec    MEDICATIONS  (STANDING):  aspirin 325milliGRAM(s) Oral daily  apixaban 5milliGRAM(s) Oral two times a day  citalopram 40milliGRAM(s) Oral daily  mirtazapine 15milliGRAM(s) Oral at bedtime  atorvastatin 40milliGRAM(s) Oral at bedtime  levothyroxine 25MICROGram(s) Oral daily  OLANZapine 5milliGRAM(s) Oral at bedtime  busPIRone 15milliGRAM(s) Oral two times a day    MEDICATIONS  (PRN):  ALBUTerol    90 MICROgram(s) HFA Inhaler 2Puff(s) Inhalation every 6 hours PRN Shortness of Breath and/or Wheezing      CXR reviewed: FINDINGS:  No focal lung consolidation. No pneumothorax or pleural effusion.   The cardiac silhouette is within normal limits in size.     EKG Reviewed: Sinus Rhythm; No acute ST, T changes    D/D:  Facial Numbness with Hx CVA concerning for CVA  Prediabetes  Hypertriglyceredemia  HTN  Hx PE on anticoagulation  Hx Depression/ Hypothyroidism    A/P:  Admit to Tele; Serial Cardiac enzymes; 2 DEcho;   Continue Apixaban; Ecotrin decrease to 81 mg daily; Resume Statin;   Follow up MRI Brain; MRA Head and Neck; Neuro evaluation Dr. Escalante. d/w Dr. Escalante; will get MRA, will hold off Carotid doppler  Hold Lisinopril for permissive HTN in case CVA; If negative MRI will resume if BP elevated; BP low at present  Add Lopid 600 mg twice daily for elevated TG; Goal less than 150; Diet and exercise;   Continue Synthroid; F/U TSH;   NP Odalys d/w daughter confirms all Psych meds; Continue Citalopram, Klonopin, Buspar and Mirtazapine which was recently increased to 30mg bedtime by Outpatient Psychiatrist NATALYA consultation on [facility].   Patient with no evidence of Dysphagia; Continue DASH diet.   PT evaluation; Ambulates with walker at baseline    Discussed with Patient. Patient seen and examined admitted overnight to Dr. Kent    HPI:  66 y/o F from home with daughter, walks with a cane and walker. 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 Pt presents to ED complaining sudden onset of numbness to the face x today 14:30hrs. Pt reports numbness initially was intermittent but now has constant numbness. Pt states numbness episode today was similar to prior stroke 2 years ago (2015); since then, pt was diagnosed with PE and is currently taking Eliquis. Pt's last Eliquis dosage was this morning 9:00AM. Pt denies fever, chills, CP, SOB, cough, palpitations, or any other complaints.     Stress test: 5 y/a WNL (29 May 2017 20:30)      ROS: c/o throat pain; numbness face; no abdominal pain, chest pain, SOB, cough, fever, headache; has leg pains; Follows commands; able to move all four extremities; Other ROS Negative    Allergies: No Known Allergies    SH: Lives with daughter; Walks with walker; Non smoker  FAMILY HISTORY: No pertinent family history in first degree relatives      Vital Signs Last 24 Hrs  T(C): 36.9, Max: 37 (05-29 @ 18:08)  T(F): 98.4, Max: 98.6 (05-29 @ 18:08)  HR: 94 (83 - 98)  BP: 140/77 (132/72 - 153/93)  RR: 16 (16 - 20)  SpO2: 97% (95% - 100%)    P/E: As per MAR above  Neuro: AAO x 3; No gross focal deficits; Power 5/5 B/L UE; 4/5 B/L LE symmetrically (chronic likely)  Neck: Tenderness on palpation in submandibular area.  CVS: S1S2 present, Regular  Resp: BLAE+; No wheeze or Rhonchi  GI: Soft, Obese, BS+, Non tender  Extr: No edema B/L LE; Mild calf tenderness; No cyanosis    Labs Reviewed:                         11.5   4.8   )-----------( 186      ( 30 May 2017 05:46 )             35.7     05-30    142  |  111<H>  |  19<H>  ----------------------------<  108<H>  4.0   |  23  |  1.06    Ca    8.5      30 May 2017 05:46  Phos  3.1     05-30  Mg     2.3     05-30    TPro  7.4  /  Alb  3.5  /  TBili  0.3  /  DBili  x   /  AST  22  /  ALT  24  /  AlkPhos  96  05-30    CARDIAC MARKERS ( 30 May 2017 05:46 )  <0.015 ng/mL / x     / 277 U/L / x     / 2.0 ng/mL  CARDIAC MARKERS ( 29 May 2017 23:46 )  <0.015 ng/mL / x     / 319 U/L / x     / 2.6 ng/mL  CARDIAC MARKERS ( 29 May 2017 18:45 )  <0.015 ng/mL / x     / x     / x     / x        PT/INR - ( 29 May 2017 19:23 )   PT: 12.1 sec;   INR: 1.11 ratio    PTT - ( 29 May 2017 19:23 )  PTT:30.5 sec    MEDICATIONS  (STANDING):  aspirin 325milliGRAM(s) Oral daily  apixaban 5milliGRAM(s) Oral two times a day  citalopram 40milliGRAM(s) Oral daily  mirtazapine 15milliGRAM(s) Oral at bedtime  atorvastatin 40milliGRAM(s) Oral at bedtime  levothyroxine 25MICROGram(s) Oral daily  OLANZapine 5milliGRAM(s) Oral at bedtime  busPIRone 15milliGRAM(s) Oral two times a day    CXR reviewed: FINDINGS: No focal lung consolidation. No pneumothorax or pleural effusion. The cardiac silhouette is within normal limits in size.     CT Brain Stroke protocol: No acute intracranial hemorrhage or mass effect. Chronic right occipital lobe infarct.   Chronic microvascular disease. Further evaluation with MRI may be obtained, if no contraindications exist.    EKG Reviewed: Sinus Rhythm; No acute ST, T changes    D/D:  Facial Numbness with Hx CVA concerning for CVA.  Prediabetes.  Hypertriglyceredemia.  HTN  Hx PE on anticoagulation.  Hx Depression with Anxiety.  Hx of Hypothyroidism.    A/P:  Admit to Tele; Serial Cardiac enzymes; 2 DEcho;   Continue Apixaban; Ecotrin decrease to 81 mg daily; Resume Statin;   Follow up MRI Brain; MRA Head and Neck; Neuro evaluation Dr. Escalante. d/w Dr. Escalante; will get MRA, will hold off Carotid doppler  Hold Lisinopril for permissive HTN in case CVA; If negative MRI will resume if BP elevated; BP low at present  Add Lopid 600 mg twice daily for elevated TG; Goal less than 150; Diet and exercise;   Continue Synthroid; F/U TSH;   NP Odalys d/w daughter confirms all Psych meds; Continue Citalopram, Klonopin, Buspar and Mirtazapine which was recently increased to 30mg bedtime by Outpatient Psychiatrist NATALYA consultation on [facility].   Patient with no evidence of Dysphagia; Continue DASH diet.   PT evaluation; Ambulates with walker at baseline    Discussed with Patient. Patient seen and examined admitted overnight to Dr. Kent    HPI:  68 y/o F from home with daughter, walks with a cane and walker. 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 Pt presents to ED complaining sudden onset of numbness to the face x today 14:30hrs. Pt reports numbness initially was intermittent but now has constant numbness. Pt states numbness episode today was similar to prior stroke 2 years ago (2015); since then, pt was diagnosed with PE and is currently taking Eliquis. Pt's last Eliquis dosage was this morning 9:00AM. Pt denies fever, chills, CP, SOB, cough, palpitations, or any other complaints.     Stress test: 5 y/a WNL (29 May 2017 20:30)      ROS: c/o throat pain; numbness face; no abdominal pain, chest pain, SOB, cough, fever, headache; has leg pains; Follows commands; able to move all four extremities; Other ROS Negative    Allergies: No Known Allergies    SH: Lives with daughter; Walks with walker; Non smoker  FAMILY HISTORY: No pertinent family history in first degree relatives      Vital Signs Last 24 Hrs  T(C): 36.9, Max: 37 (05-29 @ 18:08)  T(F): 98.4, Max: 98.6 (05-29 @ 18:08)  HR: 94 (83 - 98)  BP: 140/77 (132/72 - 153/93)  RR: 16 (16 - 20)  SpO2: 97% (95% - 100%)    P/E: As per MAR above  Neuro: AAO x 3; No gross focal deficits; Power 5/5 B/L UE; 4/5 B/L LE symmetrically (chronic likely)  Neck: Tenderness on palpation in submandibular area.  CVS: S1S2 present, Regular  Resp: BLAE+; No wheeze or Rhonchi  GI: Soft, Obese, BS+, Non tender  Extr: No edema B/L LE; Mild calf tenderness; No cyanosis    Labs Reviewed:                         11.5   4.8   )-----------( 186      ( 30 May 2017 05:46 )             35.7     05-30    142  |  111<H>  |  19<H>  ----------------------------<  108<H>  4.0   |  23  |  1.06    Ca    8.5      30 May 2017 05:46  Phos  3.1     05-30  Mg     2.3     05-30    TPro  7.4  /  Alb  3.5  /  TBili  0.3  /  DBili  x   /  AST  22  /  ALT  24  /  AlkPhos  96  05-30    CARDIAC MARKERS ( 30 May 2017 05:46 )  <0.015 ng/mL / x     / 277 U/L / x     / 2.0 ng/mL  CARDIAC MARKERS ( 29 May 2017 23:46 )  <0.015 ng/mL / x     / 319 U/L / x     / 2.6 ng/mL  CARDIAC MARKERS ( 29 May 2017 18:45 )  <0.015 ng/mL / x     / x     / x     / x        PT/INR - ( 29 May 2017 19:23 )   PT: 12.1 sec;   INR: 1.11 ratio    PTT - ( 29 May 2017 19:23 )  PTT:30.5 sec    MEDICATIONS  (STANDING):  aspirin 325milliGRAM(s) Oral daily  apixaban 5milliGRAM(s) Oral two times a day  citalopram 40milliGRAM(s) Oral daily  mirtazapine 15milliGRAM(s) Oral at bedtime  atorvastatin 40milliGRAM(s) Oral at bedtime  levothyroxine 25MICROGram(s) Oral daily  OLANZapine 5milliGRAM(s) Oral at bedtime  busPIRone 15milliGRAM(s) Oral two times a day    CXR reviewed: FINDINGS: No focal lung consolidation. No pneumothorax or pleural effusion. The cardiac silhouette is within normal limits in size.     CT Brain Stroke protocol: No acute intracranial hemorrhage or mass effect. Chronic right occipital lobe infarct.   Chronic microvascular disease. Further evaluation with MRI may be obtained, if no contraindications exist.    EKG Reviewed: Sinus Rhythm; No acute ST, T changes    D/D:  Facial Numbness with Hx CVA concerning for CVA.  Prediabetes.  Hypertriglyceredemia.  HTN  Hx PE on anticoagulation.  Hx Depression with Anxiety.  Hx of Hypothyroidism.    A/P:  Admit to Tele; Serial Cardiac enzymes; 2 DEcho;   Continue Apixaban; Ecotrin decrease to 81 mg daily; Resume Statin;   Follow up MRI Brain; MRA Head and Neck; Neuro evaluation Dr. Escalante. d/w Dr. Escalante; will get MRA, will hold off Carotid doppler  Hold Lisinopril for permissive HTN in case CVA; If negative MRI will resume if BP elevated; BP low at present  Add Lopid 600 mg twice daily for elevated TG; Goal less than 150; Diet and exercise;   Continue Synthroid; F/U TSH;   NP Odalys d/w daughter confirms all Psych meds; Continue Citalopram, Klonopin, Buspar and Mirtazapine which was recently increased to 30mg bedtime by Outpatient Psychiatrist NATALYA consultation on [facility].   Patient with no evidence of Dysphagia; Continue DASH diet.   PT evaluation; Ambulates with walker at baseline    Discussed with Patient.

## 2017-05-29 NOTE — H&P ADULT - RS GEN PE MLT RESP DETAILS PC
normal/no rhonchi/no subcutaneous emphysema/no wheezes/respirations non-labored/breath sounds equal/no rales/good air movement/clear to auscultation bilaterally/no chest wall tenderness/airway patent

## 2017-05-29 NOTE — H&P ADULT - NEUROLOGICAL DETAILS
alert and oriented x 3/cranial nerves intact/no spontaneous movement/sensation intact/responds to pain/responds to verbal commands/superficial reflexes intact/deep reflexes intact

## 2017-05-29 NOTE — ED PROVIDER NOTE - PROGRESS NOTE DETAILS
D/w pt and daughter results, called Dr. Escalante, d/w Dr. Kent. Pt on Eliquis, risk greater than benefits of tpa. D/w pt and daughter results, called Dr. Escalante, d/w Dr. Kent. Pt on Eliquis, risk greater than benefits of tpa. Dysphagia pass, NIHSS 0

## 2017-05-29 NOTE — H&P ADULT - NEGATIVE GENERAL SYMPTOMS
no chills/no sweating/no fever/no anorexia/no polyuria/no weight loss/no weight gain/no polydipsia/no malaise/no polyphagia/no fatigue

## 2017-05-29 NOTE — ED ADULT NURSE REASSESSMENT NOTE - NS ED NURSE REASSESS COMMENT FT1
received pt awake alert not in distress,with saline lock intact no redness no swelling noted,hooked to cardiac monitor.

## 2017-05-29 NOTE — H&P ADULT - NEGATIVE SKIN SYMPTOMS
no brittle nails/no itching/no tumor/no change in size/color of mole/no rash/no pitted nails/no hair loss/no dryness

## 2017-05-30 LAB
24R-OH-CALCIDIOL SERPL-MCNC: 18.3 NG/ML — LOW (ref 30–100)
ALBUMIN SERPL ELPH-MCNC: 3.5 G/DL — SIGNIFICANT CHANGE UP (ref 3.5–5)
ALP SERPL-CCNC: 96 U/L — SIGNIFICANT CHANGE UP (ref 40–120)
ALT FLD-CCNC: 24 U/L DA — SIGNIFICANT CHANGE UP (ref 10–60)
ANION GAP SERPL CALC-SCNC: 8 MMOL/L — SIGNIFICANT CHANGE UP (ref 5–17)
AST SERPL-CCNC: 22 U/L — SIGNIFICANT CHANGE UP (ref 10–40)
BASOPHILS # BLD AUTO: 0.1 K/UL — SIGNIFICANT CHANGE UP (ref 0–0.2)
BASOPHILS NFR BLD AUTO: 1.1 % — SIGNIFICANT CHANGE UP (ref 0–2)
BILIRUB SERPL-MCNC: 0.3 MG/DL — SIGNIFICANT CHANGE UP (ref 0.2–1.2)
BUN SERPL-MCNC: 19 MG/DL — HIGH (ref 7–18)
CALCIUM SERPL-MCNC: 8.5 MG/DL — SIGNIFICANT CHANGE UP (ref 8.4–10.5)
CHLORIDE SERPL-SCNC: 111 MMOL/L — HIGH (ref 96–108)
CHOLEST SERPL-MCNC: 184 MG/DL — SIGNIFICANT CHANGE UP (ref 10–199)
CK MB BLD-MCNC: 0.7 % — SIGNIFICANT CHANGE UP (ref 0–3.5)
CK MB BLD-MCNC: 0.8 % — SIGNIFICANT CHANGE UP (ref 0–3.5)
CK MB CFR SERPL CALC: 2 NG/ML — SIGNIFICANT CHANGE UP (ref 0–3.6)
CK MB CFR SERPL CALC: 2.6 NG/ML — SIGNIFICANT CHANGE UP (ref 0–3.6)
CK SERPL-CCNC: 277 U/L — HIGH (ref 21–215)
CK SERPL-CCNC: 319 U/L — HIGH (ref 21–215)
CO2 SERPL-SCNC: 23 MMOL/L — SIGNIFICANT CHANGE UP (ref 22–31)
CREAT SERPL-MCNC: 1.06 MG/DL — SIGNIFICANT CHANGE UP (ref 0.5–1.3)
EOSINOPHIL # BLD AUTO: 0.4 K/UL — SIGNIFICANT CHANGE UP (ref 0–0.5)
EOSINOPHIL NFR BLD AUTO: 7.4 % — HIGH (ref 0–6)
FOLATE SERPL-MCNC: 13.2 NG/ML — SIGNIFICANT CHANGE UP (ref 4.8–24.2)
GLUCOSE SERPL-MCNC: 108 MG/DL — HIGH (ref 70–99)
HBA1C BLD-MCNC: 6.6 % — HIGH (ref 4–5.6)
HCT VFR BLD CALC: 35.7 % — SIGNIFICANT CHANGE UP (ref 34.5–45)
HDLC SERPL-MCNC: 32 MG/DL — LOW (ref 40–125)
HGB BLD-MCNC: 11.5 G/DL — SIGNIFICANT CHANGE UP (ref 11.5–15.5)
LIPID PNL WITH DIRECT LDL SERPL: 85 MG/DL — SIGNIFICANT CHANGE UP
LYMPHOCYTES # BLD AUTO: 1.4 K/UL — SIGNIFICANT CHANGE UP (ref 1–3.3)
LYMPHOCYTES # BLD AUTO: 28.4 % — SIGNIFICANT CHANGE UP (ref 13–44)
MAGNESIUM SERPL-MCNC: 2.3 MG/DL — SIGNIFICANT CHANGE UP (ref 1.6–2.6)
MCHC RBC-ENTMCNC: 28.2 PG — SIGNIFICANT CHANGE UP (ref 27–34)
MCHC RBC-ENTMCNC: 32.2 GM/DL — SIGNIFICANT CHANGE UP (ref 32–36)
MCV RBC AUTO: 87.6 FL — SIGNIFICANT CHANGE UP (ref 80–100)
MONOCYTES # BLD AUTO: 0.4 K/UL — SIGNIFICANT CHANGE UP (ref 0–0.9)
MONOCYTES NFR BLD AUTO: 9.4 % — SIGNIFICANT CHANGE UP (ref 2–14)
NEUTROPHILS # BLD AUTO: 2.6 K/UL — SIGNIFICANT CHANGE UP (ref 1.8–7.4)
NEUTROPHILS NFR BLD AUTO: 53.8 % — SIGNIFICANT CHANGE UP (ref 43–77)
PHOSPHATE SERPL-MCNC: 3.1 MG/DL — SIGNIFICANT CHANGE UP (ref 2.5–4.5)
PLATELET # BLD AUTO: 186 K/UL — SIGNIFICANT CHANGE UP (ref 150–400)
POTASSIUM SERPL-MCNC: 4 MMOL/L — SIGNIFICANT CHANGE UP (ref 3.5–5.3)
POTASSIUM SERPL-SCNC: 4 MMOL/L — SIGNIFICANT CHANGE UP (ref 3.5–5.3)
PROT SERPL-MCNC: 7.4 G/DL — SIGNIFICANT CHANGE UP (ref 6–8.3)
RBC # BLD: 4.08 M/UL — SIGNIFICANT CHANGE UP (ref 3.8–5.2)
RBC # FLD: 14.4 % — SIGNIFICANT CHANGE UP (ref 10.3–14.5)
SODIUM SERPL-SCNC: 142 MMOL/L — SIGNIFICANT CHANGE UP (ref 135–145)
TOTAL CHOLESTEROL/HDL RATIO MEASUREMENT: 5.8 RATIO — SIGNIFICANT CHANGE UP (ref 3.3–7.1)
TRIGL SERPL-MCNC: 335 MG/DL — HIGH (ref 10–149)
TROPONIN I SERPL-MCNC: <0.015 NG/ML — SIGNIFICANT CHANGE UP (ref 0–0.04)
TROPONIN I SERPL-MCNC: <0.015 NG/ML — SIGNIFICANT CHANGE UP (ref 0–0.04)
TSH SERPL-MCNC: 3.67 UU/ML — SIGNIFICANT CHANGE UP (ref 0.34–4.82)
VIT B12 SERPL-MCNC: 404 PG/ML — SIGNIFICANT CHANGE UP (ref 243–894)
WBC # BLD: 4.8 K/UL — SIGNIFICANT CHANGE UP (ref 3.8–10.5)
WBC # FLD AUTO: 4.8 K/UL — SIGNIFICANT CHANGE UP (ref 3.8–10.5)

## 2017-05-30 PROCEDURE — 99223 1ST HOSP IP/OBS HIGH 75: CPT | Mod: AI,GC

## 2017-05-30 RX ORDER — MIRTAZAPINE 45 MG/1
30 TABLET, ORALLY DISINTEGRATING ORAL AT BEDTIME
Qty: 0 | Refills: 0 | Status: DISCONTINUED | OUTPATIENT
Start: 2017-05-30 | End: 2017-06-01

## 2017-05-30 RX ORDER — ASPIRIN/CALCIUM CARB/MAGNESIUM 324 MG
81 TABLET ORAL DAILY
Qty: 0 | Refills: 0 | Status: DISCONTINUED | OUTPATIENT
Start: 2017-05-30 | End: 2017-05-30

## 2017-05-30 RX ORDER — ASPIRIN/CALCIUM CARB/MAGNESIUM 324 MG
81 TABLET ORAL DAILY
Qty: 0 | Refills: 0 | Status: DISCONTINUED | OUTPATIENT
Start: 2017-05-30 | End: 2017-06-01

## 2017-05-30 RX ADMIN — APIXABAN 5 MILLIGRAM(S): 2.5 TABLET, FILM COATED ORAL at 05:37

## 2017-05-30 RX ADMIN — APIXABAN 5 MILLIGRAM(S): 2.5 TABLET, FILM COATED ORAL at 17:48

## 2017-05-30 RX ADMIN — Medication 15 MILLIGRAM(S): at 17:48

## 2017-05-30 RX ADMIN — Medication 81 MILLIGRAM(S): at 17:53

## 2017-05-30 RX ADMIN — Medication 325 MILLIGRAM(S): at 11:45

## 2017-05-30 RX ADMIN — ATORVASTATIN CALCIUM 40 MILLIGRAM(S): 80 TABLET, FILM COATED ORAL at 21:44

## 2017-05-30 RX ADMIN — MIRTAZAPINE 30 MILLIGRAM(S): 45 TABLET, ORALLY DISINTEGRATING ORAL at 21:44

## 2017-05-30 RX ADMIN — Medication 25 MICROGRAM(S): at 05:37

## 2017-05-30 RX ADMIN — Medication 15 MILLIGRAM(S): at 05:37

## 2017-05-30 RX ADMIN — OLANZAPINE 5 MILLIGRAM(S): 15 TABLET, FILM COATED ORAL at 21:44

## 2017-05-30 RX ADMIN — CITALOPRAM 40 MILLIGRAM(S): 10 TABLET, FILM COATED ORAL at 11:45

## 2017-05-30 NOTE — CONSULT NOTE ADULT - SUBJECTIVE AND OBJECTIVE BOX
HPI:  64 y/o F from home with daughter, walks with a cane and walker. 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 Pt presents to ED complaining sudden onset of numbness to the face mostly moris-oral b/l but extends out throughout the face x today 14:30hrs. Pt reports numbness initially was intermittent but now has constant numbness. Pt states numbness episode today was similar to prior stroke 2 years ago (2015); since then, pt was diagnosed with PE and is currently taking Eliquis. Pt's last Eliquis dosage was this morning 9:00AM. Pt denies fever, chills, CP, SOB, cough, palpitations, or any other complaints.  CT head done shows old R PCA CVA no acute pathology, microvascular ischemic dz.      Stress test: 5 y/a WNL (29 May 2017 20:30)    ROS: denies headache, vision change, dysarthria/dysphagia, fevers/chills, CP/SOB, abd pain, n/v/c/d, focal weakness, bladder or bowel sxs    MEDICATIONS  (STANDING):  apixaban 5milliGRAM(s) Oral two times a day  citalopram 40milliGRAM(s) Oral daily  atorvastatin 40milliGRAM(s) Oral at bedtime  levothyroxine 25MICROGram(s) Oral daily  OLANZapine 5milliGRAM(s) Oral at bedtime  busPIRone 15milliGRAM(s) Oral two times a day  mirtazapine 30milliGRAM(s) Oral at bedtime  aspirin  chewable 81milliGRAM(s) Oral daily    MEDICATIONS  (PRN):  ALBUTerol    90 MICROgram(s) HFA Inhaler 2Puff(s) Inhalation every 6 hours PRN Shortness of Breath and/or Wheezing      Vital Signs Last 24 Hrs  T(C): 37.1, Max: 37.1 (05-30 @ 15:34)  T(F): 98.8, Max: 98.8 (05-30 @ 15:34)  HR: 90 (83 - 102)  BP: 122/68 (122/68 - 170/84)  BP(mean): --  RR: 18 (16 - 20)  SpO2: 97% (95% - 100%)  Physical Exam:  General: well appearing, NAD, non-toxic  MS: AAOx3, speech fluid, comprehension intact  CN: PERRL, EOMI, VFF, V1-V3 b/l Intact, face symmetric, tongue midline, palate symmetric, hearing intact to external rub/whisper bilateral, neck supple  Motor: 5/5 power except for mild b/l prox LE weakness, negative drift, normal tone  Sensory: sensation grossly intact  Coordination: FNF, HTS, KEVIN intact  DTR: 2+, toes down  Gait: nl base, stride, marjorie.     CBC Full  -  ( 30 May 2017 05:46 )  WBC Count : 4.8 K/uL  Hemoglobin : 11.5 g/dL  Hematocrit : 35.7 %  Platelet Count - Automated : 186 K/uL  Mean Cell Volume : 87.6 fl  Mean Cell Hemoglobin : 28.2 pg  Mean Cell Hemoglobin Concentration : 32.2 gm/dL  Auto Neutrophil # : 2.6 K/uL  Auto Lymphocyte # : 1.4 K/uL  Auto Monocyte # : 0.4 K/uL  Auto Eosinophil # : 0.4 K/uL  Auto Basophil # : 0.1 K/uL  Auto Neutrophil % : 53.8 %  Auto Lymphocyte % : 28.4 %  Auto Monocyte % : 9.4 %  Auto Eosinophil % : 7.4 %  Auto Basophil % : 1.1 %    05-30    142  |  111<H>  |  19<H>  ----------------------------<  108<H>  4.0   |  23  |  1.06    Ca    8.5      30 May 2017 05:46  Phos  3.1     05-30  Mg     2.3     05-30    TPro  7.4  /  Alb  3.5  /  TBili  0.3  /  DBili  x   /  AST  22  /  ALT  24  /  AlkPhos  96  05-30 05-30 Chol 184 LDL 85 HDL 32<L> Trig 335<H>    Imaging:    EXAM:  CT BRAIN STROKE PROTOCOL                            PROCEDURE DATE:  05/29/2017        INTERPRETATION:  CLINICAL INFORMATION: Bilateral facial numbness. Code   stroke.    COMPARISON: CT head of 1/3/2016.    PROCEDURE:   Noncontrast CT of theHead was performed from the skull base to the   vertex. Coronal and Sagittal reformats were obtained.    FINDINGS:    There is no acute intracranial hemorrhage, mass effect, midline shift,   extra-axial collection, or hydrocephalus. Interval evolution of a   previously identified right occipital lobe infarct. Redemonstration of   hypodensity within the anterior limb of the right internal capsule,   likely representing sequelae of chronic ischemia. Patchy hypodensities   within the periventricularand subcortical white matter, although   nonspecific, may reflect chronic microvascular ischemic disease.    The visualized paranasal sinuses and mastoid air cells are clear.    IMPRESSION:     No acute intracranial hemorrhage or mass effect.    Chronic right occipital lobe infarct.    Chronic microvascular disease. Further evaluation with MRI may be   obtained, if no contraindications exist.

## 2017-05-30 NOTE — CONSULT NOTE ADULT - ASSESSMENT
66yo woman with prev CVA R occipital, PE on anticoagulation p/w b/l perioral numbness extending throughout the face initially occuring intermittently then persisting for hours then completely resolving.  Ddx includes CVA recrudescence (as her first CVA began with similar sxs), simple partial seizures, and anxiety.  CVA less likely given on a/c, unless pt non-compliant.        -MRI/MRA brain wo matt  -MRA neck w matt   -EEG   -c/w eliquis   -c/w ASA 81mg unless plan for indefinite a/c then can d/c ASA   -statin   -ambulate

## 2017-05-31 DIAGNOSIS — Z86.711 PERSONAL HISTORY OF PULMONARY EMBOLISM: ICD-10-CM

## 2017-05-31 DIAGNOSIS — E03.9 HYPOTHYROIDISM, UNSPECIFIED: ICD-10-CM

## 2017-05-31 DIAGNOSIS — M79.669 PAIN IN UNSPECIFIED LOWER LEG: ICD-10-CM

## 2017-05-31 DIAGNOSIS — F41.8 OTHER SPECIFIED ANXIETY DISORDERS: ICD-10-CM

## 2017-05-31 DIAGNOSIS — F32.9 MAJOR DEPRESSIVE DISORDER, SINGLE EPISODE, UNSPECIFIED: ICD-10-CM

## 2017-05-31 DIAGNOSIS — I63.9 CEREBRAL INFARCTION, UNSPECIFIED: ICD-10-CM

## 2017-05-31 DIAGNOSIS — J02.9 ACUTE PHARYNGITIS, UNSPECIFIED: ICD-10-CM

## 2017-05-31 DIAGNOSIS — J45.909 UNSPECIFIED ASTHMA, UNCOMPLICATED: ICD-10-CM

## 2017-05-31 LAB
ALBUMIN SERPL ELPH-MCNC: 3.3 G/DL — LOW (ref 3.5–5)
ALP SERPL-CCNC: 99 U/L — SIGNIFICANT CHANGE UP (ref 40–120)
ALT FLD-CCNC: 23 U/L DA — SIGNIFICANT CHANGE UP (ref 10–60)
ANION GAP SERPL CALC-SCNC: 11 MMOL/L — SIGNIFICANT CHANGE UP (ref 5–17)
AST SERPL-CCNC: 19 U/L — SIGNIFICANT CHANGE UP (ref 10–40)
BILIRUB SERPL-MCNC: 0.5 MG/DL — SIGNIFICANT CHANGE UP (ref 0.2–1.2)
BUN SERPL-MCNC: 19 MG/DL — HIGH (ref 7–18)
CALCIUM SERPL-MCNC: 9 MG/DL — SIGNIFICANT CHANGE UP (ref 8.4–10.5)
CHLORIDE SERPL-SCNC: 111 MMOL/L — HIGH (ref 96–108)
CO2 SERPL-SCNC: 23 MMOL/L — SIGNIFICANT CHANGE UP (ref 22–31)
CREAT SERPL-MCNC: 0.96 MG/DL — SIGNIFICANT CHANGE UP (ref 0.5–1.3)
GLUCOSE SERPL-MCNC: 97 MG/DL — SIGNIFICANT CHANGE UP (ref 70–99)
HCT VFR BLD CALC: 37.4 % — SIGNIFICANT CHANGE UP (ref 34.5–45)
HGB BLD-MCNC: 12 G/DL — SIGNIFICANT CHANGE UP (ref 11.5–15.5)
MCHC RBC-ENTMCNC: 28.1 PG — SIGNIFICANT CHANGE UP (ref 27–34)
MCHC RBC-ENTMCNC: 32 GM/DL — SIGNIFICANT CHANGE UP (ref 32–36)
MCV RBC AUTO: 87.7 FL — SIGNIFICANT CHANGE UP (ref 80–100)
PLATELET # BLD AUTO: 205 K/UL — SIGNIFICANT CHANGE UP (ref 150–400)
POTASSIUM SERPL-MCNC: 4.1 MMOL/L — SIGNIFICANT CHANGE UP (ref 3.5–5.3)
POTASSIUM SERPL-SCNC: 4.1 MMOL/L — SIGNIFICANT CHANGE UP (ref 3.5–5.3)
PROT SERPL-MCNC: 7.6 G/DL — SIGNIFICANT CHANGE UP (ref 6–8.3)
RBC # BLD: 4.26 M/UL — SIGNIFICANT CHANGE UP (ref 3.8–5.2)
RBC # FLD: 14.7 % — HIGH (ref 10.3–14.5)
SODIUM SERPL-SCNC: 145 MMOL/L — SIGNIFICANT CHANGE UP (ref 135–145)
WBC # BLD: 5.4 K/UL — SIGNIFICANT CHANGE UP (ref 3.8–10.5)
WBC # FLD AUTO: 5.4 K/UL — SIGNIFICANT CHANGE UP (ref 3.8–10.5)

## 2017-05-31 PROCEDURE — 93970 EXTREMITY STUDY: CPT | Mod: 26

## 2017-05-31 PROCEDURE — 70551 MRI BRAIN STEM W/O DYE: CPT | Mod: 26

## 2017-05-31 PROCEDURE — 99232 SBSQ HOSP IP/OBS MODERATE 35: CPT | Mod: GC

## 2017-05-31 PROCEDURE — 95819 EEG AWAKE AND ASLEEP: CPT | Mod: 26

## 2017-05-31 PROCEDURE — 70549 MR ANGIOGRAPH NECK W/O&W/DYE: CPT | Mod: 26

## 2017-05-31 RX ORDER — BENZOCAINE AND MENTHOL 5; 1 G/100ML; G/100ML
1 LIQUID ORAL THREE TIMES A DAY
Qty: 0 | Refills: 0 | Status: DISCONTINUED | OUTPATIENT
Start: 2017-05-31 | End: 2017-05-31

## 2017-05-31 RX ORDER — ERGOCALCIFEROL 1.25 MG/1
50000 CAPSULE ORAL
Qty: 0 | Refills: 0 | Status: DISCONTINUED | OUTPATIENT
Start: 2017-05-31 | End: 2017-06-01

## 2017-05-31 RX ORDER — GEMFIBROZIL 600 MG
600 TABLET ORAL
Qty: 0 | Refills: 0 | Status: DISCONTINUED | OUTPATIENT
Start: 2017-05-31 | End: 2017-06-01

## 2017-05-31 RX ORDER — LISINOPRIL 2.5 MG/1
10 TABLET ORAL DAILY
Qty: 0 | Refills: 0 | Status: DISCONTINUED | OUTPATIENT
Start: 2017-05-31 | End: 2017-06-01

## 2017-05-31 RX ORDER — BENZOCAINE AND MENTHOL 5; 1 G/100ML; G/100ML
1 LIQUID ORAL EVERY 4 HOURS
Qty: 0 | Refills: 0 | Status: DISCONTINUED | OUTPATIENT
Start: 2017-05-31 | End: 2017-06-01

## 2017-05-31 RX ADMIN — Medication 25 MICROGRAM(S): at 05:24

## 2017-05-31 RX ADMIN — LISINOPRIL 10 MILLIGRAM(S): 2.5 TABLET ORAL at 17:13

## 2017-05-31 RX ADMIN — Medication 600 MILLIGRAM(S): at 17:13

## 2017-05-31 RX ADMIN — MIRTAZAPINE 30 MILLIGRAM(S): 45 TABLET, ORALLY DISINTEGRATING ORAL at 22:09

## 2017-05-31 RX ADMIN — APIXABAN 5 MILLIGRAM(S): 2.5 TABLET, FILM COATED ORAL at 17:13

## 2017-05-31 RX ADMIN — APIXABAN 5 MILLIGRAM(S): 2.5 TABLET, FILM COATED ORAL at 05:23

## 2017-05-31 RX ADMIN — ATORVASTATIN CALCIUM 40 MILLIGRAM(S): 80 TABLET, FILM COATED ORAL at 22:09

## 2017-05-31 RX ADMIN — ERGOCALCIFEROL 50000 UNIT(S): 1.25 CAPSULE ORAL at 12:14

## 2017-05-31 RX ADMIN — Medication 15 MILLIGRAM(S): at 05:24

## 2017-05-31 RX ADMIN — Medication 15 MILLIGRAM(S): at 17:13

## 2017-05-31 RX ADMIN — Medication 81 MILLIGRAM(S): at 12:13

## 2017-05-31 RX ADMIN — OLANZAPINE 5 MILLIGRAM(S): 15 TABLET, FILM COATED ORAL at 22:09

## 2017-05-31 RX ADMIN — CITALOPRAM 40 MILLIGRAM(S): 10 TABLET, FILM COATED ORAL at 12:13

## 2017-05-31 NOTE — PROGRESS NOTE ADULT - SUBJECTIVE AND OBJECTIVE BOX
Patient is a 67y old  Female who presents with a chief complaint of left face numbness (29 May 2017 20:30)      INTERVAL HPI/OVERNIGHT EVENTS: no new complaints    MEDICATIONS  (STANDING):  apixaban 5milliGRAM(s) Oral two times a day  citalopram 40milliGRAM(s) Oral daily  atorvastatin 40milliGRAM(s) Oral at bedtime  levothyroxine 25MICROGram(s) Oral daily  OLANZapine 5milliGRAM(s) Oral at bedtime  busPIRone 15milliGRAM(s) Oral two times a day  mirtazapine 30milliGRAM(s) Oral at bedtime  aspirin  chewable 81milliGRAM(s) Oral daily    MEDICATIONS  (PRN):  ALBUTerol    90 MICROgram(s) HFA Inhaler 2Puff(s) Inhalation every 6 hours PRN Shortness of Breath and/or Wheezing      Allergies    No Known Allergies    Intolerances      __________________________________________________  REVIEW OF SYSTEMS:    CONSTITUTIONAL: No fever,   EYES: no acute visual disturbances  NECK: No pain or stiffness  RESPIRATORY: No cough; No shortness of breath  CARDIOVASCULAR: No chest pain, no palpitations  GASTROINTESTINAL: No pain. No nausea or vomiting; No diarrhea   NEUROLOGICAL: No headache or numbness, no tremors  HEME/LYMPH: No  bleeding gums    Vital Signs Last 24 Hrs      ________________________________________________  PHYSICAL EXAM:  GENERAL: NAD,   HEAD:  , Normocephalic  EYES:  conjunctiva and sclera clear  NECK: Supple, No JVD    NERVOUS SYSTEM:  Alert & Oriented X3,   CHEST/LUNG: Clear to auscuitation bilaterally;   HEART: S1 S2+;   ABDOMEN: Soft, Nontender, Nondistended; Bowel sounds present  EXTREMITIES: no cyanosis; no edema; no calf tenderness    _________________________________________________  LABS: Patient is a 67y old  Female who presents with a chief complaint of left face numbness (29 May 2017 20:30)      INTERVAL HPI/OVERNIGHT EVENTS: no new complaints    MEDICATIONS  (STANDING):  apixaban 5milliGRAM(s) Oral two times a day  citalopram 40milliGRAM(s) Oral daily  atorvastatin 40milliGRAM(s) Oral at bedtime  levothyroxine 25MICROGram(s) Oral daily  OLANZapine 5milliGRAM(s) Oral at bedtime  busPIRone 15milliGRAM(s) Oral two times a day  mirtazapine 30milliGRAM(s) Oral at bedtime  aspirin  chewable 81milliGRAM(s) Oral daily  gemfibrozil 600milliGRAM(s) Oral two times a day before meals  ergocalciferol [id number](s) Oral <User Schedule>    MEDICATIONS  (PRN):  ALBUTerol    90 MICROgram(s) HFA Inhaler 2Puff(s) Inhalation every 6 hours PRN Shortness of Breath and/or Wheezing  benzocaine 15 mG/menthol 3.6 mG Lozenge 1Lozenge Oral every 4 hours PRN Sore Throat      Allergies    No Known Allergies    Intolerances      __________________________________________________  REVIEW OF SYSTEMS:    CONSTITUTIONAL: No fever,   EYES: no acute visual disturbances  NECK: No pain or stiffness  RESPIRATORY: No cough; No shortness of breath  CARDIOVASCULAR: No chest pain, no palpitations  GASTROINTESTINAL: No pain. No nausea or vomiting; No diarrhea   NEUROLOGICAL: No headache or numbness, no tremors  HEME/LYMPH: No  bleeding gums    Vital Signs Last 24 Hrs  T(C): 36.7, Max: 37.1 (05-30 @ 15:34)  T(F): 98, Max: 98.8 (05-30 @ 15:34)  HR: 96 (87 - 118)  BP: 145/77 (122/68 - 170/84)  BP(mean): --  RR: 17 (16 - 18)  SpO2: 99% (97% - 99%)    ________________________________________________  PHYSICAL EXAM:  GENERAL: NAD,   HEAD:  , Normocephalic  EYES:  conjunctiva and sclera clear  NECK: Supple, No JVD    NERVOUS SYSTEM:  Alert & Oriented X3,   CHEST/LUNG: Clear to auscuitation bilaterally;   HEART: S1 S2+;   ABDOMEN: Soft, Nontender, Nondistended; Bowel sounds present  EXTREMITIES: no cyanosis; no edema; no calf tenderness    _________________________________________________  LABS:                        12.0   5.4   )-----------( 205      ( 31 May 2017 08:26 )             37.4     05-31    145  |  111<H>  |  19<H>  ----------------------------<  97  4.1   |  23  |  0.96    Ca    9.0      31 May 2017 08:26  Phos  3.1     05-30  Mg     2.3     05-30    TPro  7.6  /  Alb  3.3<L>  /  TBili  0.5  /  DBili  x   /  AST  19  /  ALT  23  /  AlkPhos  99  05-31    PT/INR - ( 29 May 2017 19:23 )   PT: 12.1 sec;   INR: 1.11 ratio         PTT - ( 29 May 2017 19:23 )  PTT:30.5 sec          1. Normal mitral valve.  2. Normal trileaflet aortic valve.  3. Normal aortic root.  4. Mild left atrial enlargement.  5. Normal left ventricular internal dimensions and wall  thicknesses.  6. Normal Left Ventricular Systolic Function,  (EF = 55 to  60%)  7. Grade Idiastolic dysfunction  8. Normal right atrium.  9. Normal right ventricular size and function.  10. Unable to estimate RVSP.  11. Normal tricuspid valve.  12. There is mild pulmonic regurgitation.  13. Normal pericardium with no pericardial effusion. Patient is a 67y old  Female who presents with a chief complaint of left face numbness (29 May 2017 20:30)      INTERVAL HPI/OVERNIGHT EVENTS: no new complaints    MEDICATIONS  (STANDING):  apixaban 5milliGRAM(s) Oral two times a day  citalopram 40milliGRAM(s) Oral daily  atorvastatin 40milliGRAM(s) Oral at bedtime  levothyroxine 25MICROGram(s) Oral daily  OLANZapine 5milliGRAM(s) Oral at bedtime  busPIRone 15milliGRAM(s) Oral two times a day  mirtazapine 30milliGRAM(s) Oral at bedtime  aspirin  chewable 81milliGRAM(s) Oral daily  gemfibrozil 600milliGRAM(s) Oral two times a day before meals  ergocalciferol [id number](s) Oral <User Schedule>    MEDICATIONS  (PRN):  ALBUTerol    90 MICROgram(s) HFA Inhaler 2Puff(s) Inhalation every 6 hours PRN Shortness of Breath and/or Wheezing  benzocaine 15 mG/menthol 3.6 mG Lozenge 1Lozenge Oral every 4 hours PRN Sore Throat      Allergies    No Known Allergies    Intolerances      __________________________________________________  REVIEW OF SYSTEMS:    CONSTITUTIONAL: No fever,   EYES: no acute visual disturbances  NECK: No pain or stiffness  RESPIRATORY: No cough; No shortness of breath  CARDIOVASCULAR: No chest pain, no palpitations  GASTROINTESTINAL: No pain. No nausea or vomiting; No diarrhea   NEUROLOGICAL: No headache or numbness, no tremors  HEME/LYMPH: No  bleeding gums  EXTREMITIES: Bilateral calf pain     Vital Signs Last 24 Hrs  T(C): 36.7, Max: 37.1 (05-30 @ 15:34)  T(F): 98, Max: 98.8 (05-30 @ 15:34)  HR: 96 (87 - 118)  BP: 145/77 (122/68 - 170/84)  BP(mean): --  RR: 17 (16 - 18)  SpO2: 99% (97% - 99%)    ________________________________________________  PHYSICAL EXAM:  GENERAL: NAD,   HEAD:  , Normocephalic  EYES:  conjunctiva and sclera clear  NECK: Supple, No JVD    NERVOUS SYSTEM:  Alert & Oriented X3,   CHEST/LUNG: Clear to auscultation bilaterally;   HEART: S1 S2+;   ABDOMEN: Soft, Nontender, Nondistended; Bowel sounds present  EXTREMITIES: no cyanosis; 1 + edema; bilateral  calf tenderness    _________________________________________________  LABS:                        12.0   5.4   )-----------( 205      ( 31 May 2017 08:26 )             37.4     05-31    145  |  111<H>  |  19<H>  ----------------------------<  97  4.1   |  23  |  0.96    Ca    9.0      31 May 2017 08:26  Phos  3.1     05-30  Mg     2.3     05-30    TPro  7.6  /  Alb  3.3<L>  /  TBili  0.5  /  DBili  x   /  AST  19  /  ALT  23  /  AlkPhos  99  05-31    PT/INR - ( 29 May 2017 19:23 )   PT: 12.1 sec;   INR: 1.11 ratio         PTT - ( 29 May 2017 19:23 )  PTT:30.5 sec        ECHO:  1. Normal mitral valve.  2. Normal trileaflet aortic valve.  3. Normal aortic root.  4. Mild left atrial enlargement.  5. Normal left ventricular internal dimensions and wall  thicknesses.  6. Normal Left Ventricular Systolic Function,  (EF = 55 to  60%)  7. Grade Idiastolic dysfunction  8. Normal right atrium.  9. Normal right ventricular size and function.  10. Unable to estimate RVSP.  11. Normal tricuspid valve.  12. There is mild pulmonic regurgitation.  13. Normal pericardium with no pericardial effusion.          MRI/MRA :     No acute intracranial hemorrhage or acute infarct.    Evaluation of proximal nondominant right vertebral artery limited.   Otherwise, no hemodynamically significant stenosis in the neck.    Multifocal stenosis right posterior cerebral artery with attenuation of   distal right posterior cerebral artery.    Small infundibulum versus 1 mm aneurysm at the region of the origin of   left posterior to indicating artery in the distal left internal carotid   artery. Follow-up MRA exam recommended in 3-6 months to ensure stability.

## 2017-05-31 NOTE — PROGRESS NOTE ADULT - ASSESSMENT
64 y/o F from home with daughter, walks with a cane and walker. 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 Pt presents to ED complaining sudden onset of numbness to the face x today 14:30hrs. Pt reports numbness initially was intermittent but now has constant numbness. Pt states numbness episode today was similar to prior stroke 2 years ago (2015); since then, pt was diagnosed with PE and is currently taking Eliquis. Pt's last Eliquis dosage was this morning 9:00AM. Pt denies fever, chills, CP, SOB, cough, palpitations, or any other complaints. 66 y/o F from home with daughter, walks with a cane and walker. 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 Pt presents to ED complaining sudden onset of numbness to the face x today 14:30hrs. Pt reports numbness initially was intermittent but now has constant numbness. Pt states numbness episode today was similar to prior stroke 2 years ago (2015); since then, pt was diagnosed with PE and is currently taking Eliquis. Pt's last Eliquis dosage was this morning 9:00AM. Pt denies fever, chills, CP, SOB, cough, palpitations, or any other complaints.

## 2017-05-31 NOTE — PROGRESS NOTE ADULT - PROBLEM SELECTOR PLAN 1
NIH score of 2-3 (left facial numbness and left hand + arm numbness)  Out of TPA window since admission and high risk for AC   Pt has been off atorvastatin for the past 6 months, pt was told lipid profile WNL   CT head negative ( chronic right occipital lobe infarct)   Pt passed bedside speech and swallow evaluation   c/w permissive hypertensive period for 24-48hrs  c/w ASA, statin   TSH normal, HBA1c 6.6, Cardiac enzymes x 2 negative  Lipid profile TAGs 335, HDL 32  F/u Brain MRI / MRA  F/u echocardiogram   EKG NSR   Telemetry : sinus tachy to 120   PT evaluation recommended home   Neurology consulted Dr. Escalante, recommending to get the EEG NIH score of 2-3 (left facial numbness and left hand + arm numbness)  Out of TPA window since admission and high risk for AC   Pt has been off atorvastatin for the past 6 months, pt was told lipid profile WNL   CT head negative ( chronic right occipital lobe infarct)   Pt passed bedside speech and swallow evaluation   c/w permissive hypertensive period for 24-48hrs  c/w ASA, statin   TSH normal, HBA1c 6.6, Cardiac enzymes x 2 negative  Lipid profile TAGs 335, HDL 32   Brain MRI / MRA ( Multifocal stenosis right posterior cerebral artery with attenuation of  distal right posterior cerebral artery. Small infundibulum versus 1 mm aneurysm at the region of the origin of  left posterior to indicating artery in the distal left internal carotid  artery. Follow-up MRA exam recommended in 3-6 months to ensure stability.  EKG NSR   Telemetry : sinus tachy to 120  PT evaluation recommended home   Neurology consulted Dr. Escalante, recommending to get the EEG NIH score of 2-3 (left facial numbness and left hand + arm numbness)  Out of TPA window since admission and high risk for AC   Pt has been off atorvastatin for the past 6 months, pt was told lipid profile WNL   CT head negative ( chronic right occipital lobe infarct)   Pt passed bedside speech and swallow evaluation   c/w permissive hypertensive period for 24-48hrs  c/w ASA, statin   TSH normal, HBA1c 6.6, Cardiac enzymes x 2 negative  Lipid profile TAGs 335, HDL 32   Brain MRI / MRA : Normal , no stroke   EKG NSR   Telemetry : sinus tachy to 120  PT evaluation recommended home   Neurology consulted Dr. Escalante, recommending to get the EEG  Telemetry discontinue as no stroke

## 2017-05-31 NOTE — PROGRESS NOTE ADULT - ASSESSMENT
66 y/o F from home with daughter, walks with a cane and walker. 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 Pt presents to ED complaining sudden onset of numbness to the face x today 14:30hrs. Pt reports numbness initially was intermittent but now has constant numbness. Pt states numbness episode today was similar to prior stroke 2 years ago (2015); since then, pt was diagnosed with PE and is currently taking Eliquis. Pt's last Eliquis dosage was this morning 9:00AM.

## 2017-05-31 NOTE — EEG REPORT - NS EEG TEXT BOX
[facility]  Report of Routine EEG with Video  And Report of DigitalCompressed Spectral Array Analysis    [facility]: [address], [address] [place] [address], Phone: [phone]  [facility]: [address], [place] [address], Phone: [phone]  Office: [address] [facility], [place] [address], [place] [address], Phone: [phone]    Patient Name: LENNIE LIAO    Age: 67 y  : 1950  Patient ID: -, MRN #: -, Location: -  Referring Physician: -    EEG #:   Study Date: 2017		    Technical Information:					  On Instrument: -  Placement and Labeling of Electrodes:  The EEG was performed utilizing 20 channels referential EEG connections (coronal over temporal over parasagittal montage) using all standard 10-20 electrode placements with EKG.  Recording was at a sampling rate of 256 samples per second per channel.  Time synchronized digital video recording was done simultaneously with EEG recording.  A low light infrared camera was used for low light recording.  Boogie and seizure detection algorithms were utilized.  CSA Technical Component:  Quantitative EEG analysis using a separate Compressed Spectral Array (CSA) software package was conducted in real-time and run at bedside after set up by the technician, digitally displaying the power of electrographic frequencies included in the 1-30Hz band using a graded color map.  This data was reviewed and interpreted independently, and is reported in a separate section below.    History:  seizures    Medication	  No Data.	    Study Interpretation:    FINDINGS:  The background was continuous, spontaneously variable and reactive.  During wakefulness, the posteriorly dominant rhythm consisted of symmetric, well modulated 8 Hz activity, with an amplitude to 30 uV, that attenuated to eye opening.  Moderate amplitude beta was noted in wakefulness, often higher amplitude on the left compared to right. There is no breech artifact..    Background Slowing:  Generalized slowing: intermittent theta activity diffusely.  Focal slowing: none was present.    Sleep Background:  Drowsiness was characterized by fragmentation, attenuation, and slowing of the background activity.    Stage II sleep transients were not recorded.    Epileptiform Activity:   No epileptiform discharges were present.    Events:  No clinical events were recorded.  No seizures were recorded.    Activation Procedures:   -Hyperventilation was not performed.    -Photic stimulation was performed and did not elicit any abnormalities.      Artifacts:  Intermittent myogenic and movement artifacts were noted.    ECG:  The single channel ECG was not interpretable.    Compressed Spectral Array Digital Analysis    FINDINGS:  Compressed Spectral Array (CSA) data was reviewed separately and correlated with the electroencephalographic findings detailed above.  CSA showed a variable spectral pattern.  Areas of increased power in particular were reviewed in detail, and compared with the raw EEG data.  Areas of abrupt increases in spectral power were reviewed to exclude seizures, and were determined to be artifactual in nature.    The relative ratio of the power of delta range frequencies and faster frequencies remained stable over the course of the study.  There was no definitive increase in the relative power in the delta frequency spectrum apparent in the left hemisphere versus the right hemisphere.      Compressed Spectral Array (Digital Analysis) Summary/ Impression:  No persistent hemispheric asymmetry.  Intermittent areas of increased power reviewed, without definite epileptiform activity associated on CSA.      EEG Classification / Summary:  Abnormal Routine EEG Study   -	Background slowing, generalized    Clinical Impression:  Mild diffuse cerebral dysfunction. There were no epileptiform abnormalities recorded.  There is an asymmetry noted in the fast activity (beta) that appears asymmetrically increased over the left frontal region.  While this might be seen in with a breech artifact, review of CT head shows no skull breech. There is not other significant association with asymmetrical fast activity.     Ilan Millan MD  Director, Epilepsy, [facility]

## 2017-05-31 NOTE — PROGRESS NOTE ADULT - ATTENDING COMMENTS
Discussed with patient;  Discussed with PGY1 Dr. Alonso.  D/C Plan Home with resumption of [facility] services tomorrow;   Notify Case management

## 2017-05-31 NOTE — PROGRESS NOTE ADULT - PROBLEM SELECTOR PLAN 4
Continue with home medications ramipril 30 mg, buspirone 15 BID, celexa 40 mg, olanzapine 5 mg   Patient has outpatient psychiatrist , she will follow with that
Continue Citalopram, Mirtazapine, Olanzapine and Buspar;   Follow up with outpatient Psychiatrist.

## 2017-05-31 NOTE — PROGRESS NOTE ADULT - PROBLEM SELECTOR PLAN 1
Facial numbness possible concern for CVA.   Await MRI/ MRA results. Echo insignificant; Tele: No arrhythmia few episodes sinus tachycardia. If MRI negative, can discontinue Telemetry. If acute CVA, will get Cardio Dr. Brooks. Continue Ecotrin 81mg daily, Statin and a/c with Apixaban.  Neuro evaluation appreciated. recommend EEG to evaluate for seizure activity.

## 2017-05-31 NOTE — PROGRESS NOTE ADULT - SUBJECTIVE AND OBJECTIVE BOX
Patient is a 67y old  Female who presents with a chief complaint of left face numbness (29 May 2017 20:30)      INTERVAL HPI/OVERNIGHT EVENTS:    MEDICATIONS  (STANDING):  apixaban 5milliGRAM(s) Oral two times a day  citalopram 40milliGRAM(s) Oral daily  atorvastatin 40milliGRAM(s) Oral at bedtime  levothyroxine 25MICROGram(s) Oral daily  OLANZapine 5milliGRAM(s) Oral at bedtime  busPIRone 15milliGRAM(s) Oral two times a day  mirtazapine 30milliGRAM(s) Oral at bedtime  aspirin  chewable 81milliGRAM(s) Oral daily  gemfibrozil 600milliGRAM(s) Oral two times a day before meals  ergocalciferol [id number]) Oral <User Schedule>    MEDICATIONS  (PRN):  ALBUTerol    90 MICROgram(s) HFA Inhaler 2Puff(s) Inhalation every 6 hours PRN Shortness of Breath and/or Wheezing  benzocaine 15 mG/menthol 3.6 mG Lozenge 1Lozenge Oral every 4 hours PRN Sore Throat      Allergies: No Known Allergies        REVIEW OF SYSTEMS:  CONSTITUTIONAL: No fever, weight loss, or fatigue  RESPIRATORY: No cough, wheezing, chills or hemoptysis; No shortness of breath  CARDIOVASCULAR: No chest pain, palpitations, dizziness, or leg swelling  GASTROINTESTINAL: No abdominal or epigastric pain. No nausea, vomiting, or hematemesis; No diarrhea or constipation. No melena or hematochezia.  NEUROLOGICAL: No headaches, memory loss, loss of strength, numbness, or tremors  MUSCULOSKELETAL: No joint pain or swelling; No muscle, back, or extremity pain  SKIN: No rashes or lesions      Vital Signs Last 24 Hrs  T(C): 36.7, Max: 37.1 (05-30 @ 15:34)  T(F): 98, Max: 98.8 (05-30 @ 15:34)  HR: 96 (87 - 118)  BP: 145/77 (122/68 - 170/84)  RR: 17 (16 - 18)  SpO2: 99% (97% - 99%)    PHYSICAL EXAM:  GENERAL: NAD, well-groomed, well-developed  HEAD:  Atraumatic, Normocephalic  EYES: EOMI, PERRLA, conjunctiva and sclera clear  NECK: Supple, No JVD  NERVOUS SYSTEM:  Alert & Oriented X3, No gross focal deficits  CHEST/LUNG: Clear to percussion bilaterally; No rales, rhonchi, wheezing, or rubs  HEART: Regular rate and rhythm; S1S2 present  ABDOMEN: Soft, Nontender, Nondistended; Bowel sounds present  EXTREMITIES:  No clubbing, cyanosis, or edema  SKIN: No rashes or lesions    LABS:                        12.0   5.4   )-----------( 205      ( 31 May 2017 08:26 )             37.4     05-31    145  |  111<H>  |  19<H>  ----------------------------<  97  4.1   |  23  |  0.96    Ca    9.0      31 May 2017 08:26  Phos  3.1     05-30  Mg     2.3     05-30    TPro  7.6  /  Alb  3.3<L>  /  TBili  0.5  /  DBili  x   /  AST  19  /  ALT  23  /  AlkPhos  99  05-31    PT/INR - ( 29 May 2017 19:23 )   PT: 12.1 sec;   INR: 1.11 ratio         PTT - ( 29 May 2017 19:23 )  PTT:30.5 sec    2D ECHO: CONCLUSIONS  1. Normal mitral valve.  2. Normal trileaflet aortic valve.  3. Normal aortic root.  4. Mild left atrial enlargement.  5. Normal left ventricular internal dimensions and wall thicknesses.  6. Normal Left Ventricular Systolic Function,  (EF = 55 to 60%)  7. Grade Idiastolic dysfunction  8. Normal right atrium.  9. Normal right ventricular size and function.  10. Unable to estimate RVSP.  11. Normal tricuspid valve.  12. There is mild pulmonic regurgitation.  13. Normal pericardium with no pericardial effusion.        RADIOLOGY & ADDITIONAL TESTS:    CT Brain Stroke protocol: IMPRESSION:  No acute intracranial hemorrhage or mass effect.  Chronic right occipital lobe infarct.  Chronic microvascular disease. Further evaluation with MRI may be obtained, if no contraindications exist.      Consultant(s) Notes Reviewed:  [x] YES  [ ] NO    Care Discussed with Consultants/Other Providers [x] YES  [ ] NO    Plan of Care discussed with Housestaff [x]YES [ ] NO Patient is a 67y old  Female who presents with a chief complaint of left face numbness (29 May 2017 20:30)      INTERVAL HPI/OVERNIGHT EVENTS: None; No new complaints; Still c/o Sore throat and pain under jaws    MEDICATIONS  (STANDING):  apixaban 5milliGRAM(s) Oral two times a day  citalopram 40milliGRAM(s) Oral daily  atorvastatin 40milliGRAM(s) Oral at bedtime  levothyroxine 25MICROGram(s) Oral daily  OLANZapine 5milliGRAM(s) Oral at bedtime  busPIRone 15milliGRAM(s) Oral two times a day  mirtazapine 30milliGRAM(s) Oral at bedtime  aspirin  chewable 81milliGRAM(s) Oral daily  gemfibrozil 600milliGRAM(s) Oral two times a day before meals  ergocalciferol [id number](s) Oral <User Schedule>    MEDICATIONS  (PRN):  ALBUTerol    90 MICROgram(s) HFA Inhaler 2Puff(s) Inhalation every 6 hours PRN Shortness of Breath and/or Wheezing  benzocaine 15 mG/menthol 3.6 mG Lozenge 1Lozenge Oral every 4 hours PRN Sore Throat      Allergies: No Known Allergies        REVIEW OF SYSTEMS:  CONSTITUTIONAL: No fever, weight loss, or fatigue  RESPIRATORY: No cough, wheezing, chills or hemoptysis; No shortness of breath  CARDIOVASCULAR: No chest pain, palpitations, dizziness, or leg swelling  GASTROINTESTINAL: No abdominal or epigastric pain. No nausea, vomiting, or hematemesis; No diarrhea or constipation. No melena or hematochezia.  NEUROLOGICAL: No headaches, memory loss, loss of strength, numbness, or tremors  MUSCULOSKELETAL: No joint pain or swelling; No muscle, back, or extremity pain  SKIN: No rashes or lesions      Vital Signs Last 24 Hrs  T(C): 36.7, Max: 37.1 (05-30 @ 15:34)  T(F): 98, Max: 98.8 (05-30 @ 15:34)  HR: 96 (87 - 118)  BP: 145/77 (122/68 - 170/84)  RR: 17 (16 - 18)  SpO2: 99% (97% - 99%)    PHYSICAL EXAM:  GENERAL: NAD, well-groomed, well-developed  HEAD:  Atraumatic, Normocephalic  EYES: EOMI, PERRLA, conjunctiva and sclera clear  NECK: Supple, No JVD  NERVOUS SYSTEM:  Alert & Oriented X3, No gross focal deficits neurologically.   CHEST/LUNG: Clear to percussion bilaterally; No rales, rhonchi, wheezing, or rubs  HEART: Regular rate and rhythm; S1S2 present  ABDOMEN: Soft, Nontender, Nondistended; Bowel sounds present  EXTREMITIES:  No clubbing, cyanosis, or edema  SKIN: No rashes or lesions  Neck: Submandibular tenderness    LABS:                        12.0   5.4   )-----------( 205      ( 31 May 2017 08:26 )             37.4     05-31    145  |  111<H>  |  19<H>  ----------------------------<  97  4.1   |  23  |  0.96    Ca    9.0      31 May 2017 08:26  Phos  3.1     05-30  Mg     2.3     05-30    TPro  7.6  /  Alb  3.3<L>  /  TBili  0.5  /  DBili  x   /  AST  19  /  ALT  23  /  AlkPhos  99  05-31    PT/INR - ( 29 May 2017 19:23 )   PT: 12.1 sec;   INR: 1.11 ratio         PTT - ( 29 May 2017 19:23 )  PTT:30.5 sec    2D ECHO: CONCLUSIONS  1. Normal mitral valve.  2. Normal trileaflet aortic valve.  3. Normal aortic root.  4. Mild left atrial enlargement.  5. Normal left ventricular internal dimensions and wall thicknesses.  6. Normal Left Ventricular Systolic Function,  (EF = 55 to 60%)  7. Grade Idiastolic dysfunction  8. Normal right atrium.  9. Normal right ventricular size and function.  10. Unable to estimate RVSP.  11. Normal tricuspid valve.  12. There is mild pulmonic regurgitation.  13. Normal pericardium with no pericardial effusion.        RADIOLOGY & ADDITIONAL TESTS:    CT Brain Stroke protocol: IMPRESSION:  No acute intracranial hemorrhage or mass effect.  Chronic right occipital lobe infarct.  Chronic microvascular disease. Further evaluation with MRI may be obtained, if no contraindications exist.      Consultant(s) Notes Reviewed:  [x] YES  [ ] NO    Care Discussed with Consultants/Other Providers [x] YES  [ ] NO    Plan of Care discussed with Housestaff [x]YES [ ] NO

## 2017-05-31 NOTE — PROGRESS NOTE ADULT - PROBLEM SELECTOR PLAN 8
Doppler bilateral LE
Patient has bilateral calf tenderness , 1 + edema, will do the doppler lower extremity

## 2017-05-31 NOTE — PROGRESS NOTE ADULT - PROBLEM SELECTOR PLAN 2
On  lisinopril 10mg daily at home    hold for now due to permissive HTN  BP normal On  lisinopril 10mg daily at home   Restarted the home dose

## 2017-06-01 VITALS
DIASTOLIC BLOOD PRESSURE: 89 MMHG | OXYGEN SATURATION: 97 % | RESPIRATION RATE: 16 BRPM | HEART RATE: 90 BPM | TEMPERATURE: 98 F | SYSTOLIC BLOOD PRESSURE: 127 MMHG

## 2017-06-01 PROCEDURE — 82550 ASSAY OF CK (CPK): CPT

## 2017-06-01 PROCEDURE — 83735 ASSAY OF MAGNESIUM: CPT

## 2017-06-01 PROCEDURE — 93306 TTE W/DOPPLER COMPLETE: CPT

## 2017-06-01 PROCEDURE — 93005 ELECTROCARDIOGRAM TRACING: CPT

## 2017-06-01 PROCEDURE — 80053 COMPREHEN METABOLIC PANEL: CPT

## 2017-06-01 PROCEDURE — 83036 HEMOGLOBIN GLYCOSYLATED A1C: CPT

## 2017-06-01 PROCEDURE — 97162 PT EVAL MOD COMPLEX 30 MIN: CPT

## 2017-06-01 PROCEDURE — 99291 CRITICAL CARE FIRST HOUR: CPT | Mod: 25

## 2017-06-01 PROCEDURE — 85027 COMPLETE CBC AUTOMATED: CPT

## 2017-06-01 PROCEDURE — 95957 EEG DIGITAL ANALYSIS: CPT

## 2017-06-01 PROCEDURE — 84443 ASSAY THYROID STIM HORMONE: CPT

## 2017-06-01 PROCEDURE — 70549 MR ANGIOGRAPH NECK W/O&W/DYE: CPT

## 2017-06-01 PROCEDURE — 71045 X-RAY EXAM CHEST 1 VIEW: CPT

## 2017-06-01 PROCEDURE — 82607 VITAMIN B-12: CPT

## 2017-06-01 PROCEDURE — 84100 ASSAY OF PHOSPHORUS: CPT

## 2017-06-01 PROCEDURE — 82553 CREATINE MB FRACTION: CPT

## 2017-06-01 PROCEDURE — 99239 HOSP IP/OBS DSCHRG MGMT >30: CPT

## 2017-06-01 PROCEDURE — 84484 ASSAY OF TROPONIN QUANT: CPT

## 2017-06-01 PROCEDURE — 82746 ASSAY OF FOLIC ACID SERUM: CPT

## 2017-06-01 PROCEDURE — 80061 LIPID PANEL: CPT

## 2017-06-01 PROCEDURE — 85610 PROTHROMBIN TIME: CPT

## 2017-06-01 PROCEDURE — 93970 EXTREMITY STUDY: CPT

## 2017-06-01 PROCEDURE — 70450 CT HEAD/BRAIN W/O DYE: CPT

## 2017-06-01 PROCEDURE — 70551 MRI BRAIN STEM W/O DYE: CPT

## 2017-06-01 PROCEDURE — 82306 VITAMIN D 25 HYDROXY: CPT

## 2017-06-01 PROCEDURE — 70544 MR ANGIOGRAPHY HEAD W/O DYE: CPT

## 2017-06-01 PROCEDURE — 85730 THROMBOPLASTIN TIME PARTIAL: CPT

## 2017-06-01 PROCEDURE — 95819 EEG AWAKE AND ASLEEP: CPT

## 2017-06-01 RX ORDER — ERGOCALCIFEROL 1.25 MG/1
1 CAPSULE ORAL
Qty: 4 | Refills: 0 | OUTPATIENT
Start: 2017-06-01 | End: 2017-07-01

## 2017-06-01 RX ORDER — GEMFIBROZIL 600 MG
1 TABLET ORAL
Qty: 60 | Refills: 0 | OUTPATIENT
Start: 2017-06-01 | End: 2017-07-01

## 2017-06-01 RX ADMIN — Medication 25 MICROGRAM(S): at 06:03

## 2017-06-01 RX ADMIN — Medication 81 MILLIGRAM(S): at 12:04

## 2017-06-01 RX ADMIN — LISINOPRIL 10 MILLIGRAM(S): 2.5 TABLET ORAL at 06:03

## 2017-06-01 RX ADMIN — Medication 600 MILLIGRAM(S): at 15:54

## 2017-06-01 RX ADMIN — Medication 600 MILLIGRAM(S): at 06:03

## 2017-06-01 RX ADMIN — APIXABAN 5 MILLIGRAM(S): 2.5 TABLET, FILM COATED ORAL at 06:03

## 2017-06-01 RX ADMIN — Medication 15 MILLIGRAM(S): at 06:03

## 2017-06-01 RX ADMIN — CITALOPRAM 40 MILLIGRAM(S): 10 TABLET, FILM COATED ORAL at 12:04

## 2017-06-01 NOTE — DISCHARGE NOTE ADULT - HOSPITAL COURSE
64 y/o F from home with daughter, walks with a cane and walker. 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 Pt presents to ED complaining sudden onset of numbness to the face x today 14:30hrs. Pt reports numbness initially was intermittent but now has constant numbness. Pt states numbness episode today was similar to prior stroke 2 years ago (2015); since then, pt was diagnosed with PE and is currently taking Eliquis. Pt's last Eliquis dosage was this morning 9:00AM. Pt denies fever, chills, CP, SOB, cough, palpitations, or any other complaints.       Admitted for CVA (cerebral vascular accident) with facial numbness possible concern for CVA. CT head negative.   MRI/ MRA no acute intracranial hemorrhage or acute infarct. Evaluation of proximal nondominant right vertebral artery limited.  Otherwise, no hemodynamically significant stenosis in the neck. Multifocal stenosis right posterior cerebral artery with attenuation of  distal right posterior cerebral artery. Small infundibulum versus 1 mm aneurysm at the region of the origin of left posterior to indicating artery in the distal left internal carotid  artery. Follow-up MRA exam recommended in 3-6 months to ensure stability. . Echo insignificant EF 55-60%, grade 1 DD, mild left atrial enlargement , monitored on  Tele: No arrhythmia few episodes of sinus tachycardia. Continued with Ecotrin 81mg daily, Statin and a/c with Apixaban. Neuro evaluation by Dr Escalante. .  EEG done shows mild diffuse cerebral dysfunction no seizures noted. Continue Apixaban for PE.   Lisinopril on hold for permissive HTN in the event of CVA, later restarted. For  Depression with anxiety home meds continued Citalopram, Mirtazapine, Olanzapine and Buspar;  Follow up with outpatient Psychiatrist. Continued Synthroid for hypothyroidism For  Asthma. bronchodilators as needed given , symptoms improved. Patient also complains of sore throat  Cepacol lozenges. patient complain of bilateral calf tenderness more on right side, doppler bilateral LE negative for DVT. Vitamin D was low , weekly replacement given.     Patient symptoms resolved, she is evaluated by PT recommended home, home care services resumed by . Patient stable to be discharged , discussed with the attending. 66 y/o F from home with daughter, walks with a cane and walker. 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 Pt presents to ED complaining sudden onset of numbness to the face x today 14:30hrs. Pt reports numbness initially was intermittent but now has constant numbness. Pt states numbness episode today was similar to prior stroke 2 years ago (2015); since then, pt was diagnosed with PE and is currently taking Eliquis. Pt's last Eliquis dosage was this morning 9:00AM. Pt denies fever, chills, CP, SOB, cough, palpitations, or any other complaints.       Admitted for CVA (cerebral vascular accident) with facial numbness possible concern for CVA. CT head negative.   MRI/ MRA no acute intracranial hemorrhage or acute infarct. Evaluation of proximal nondominant right vertebral artery limited.  Otherwise, no hemodynamically significant stenosis in the neck. Multifocal stenosis right posterior cerebral artery with attenuation of  distal right posterior cerebral artery. Small infundibulum versus 1 mm aneurysm at the region of the origin of left posterior to indicating artery in the distal left internal carotid  artery. Follow-up MRA exam recommended in 3-6 months to ensure stability. . Echo insignificant EF 55-60%, grade 1 DD, mild left atrial enlargement , monitored on  Tele: No arrhythmia few episodes of sinus tachycardia. Continued with Ecotrin 81mg daily, Statin and a/c with Apixaban. Neuro evaluation by Dr Escalante. .  EEG done shows mild diffuse cerebral dysfunction no seizures noted. Continue Apixaban for PE.   Lisinopril on hold for permissive HTN in the event of CVA, later restarted. For  Depression with anxiety home meds continued Citalopram, Mirtazapine, Olanzapine and Buspar;  Follow up with outpatient Psychiatrist. Continued Synthroid for hypothyroidism For  Asthma. bronchodilators as needed given , symptoms improved. Patient also complains of sore throat  Cepacol lozenges. patient complain of bilateral calf tenderness more on right side, doppler bilateral LE negative for DVT. Vitamin D was low , weekly replacement given.     Patient symptoms resolved, she is evaluated by PT recommended home, home care services resumed by . Patient stable to be discharged , discussed with the attending.

## 2017-06-01 NOTE — DISCHARGE NOTE ADULT - MEDICATION SUMMARY - MEDICATIONS TO TAKE
I will START or STAY ON the medications listed below when I get home from the hospital:    aspirin 81 mg oral tablet, chewable  -- 1 tab(s) by mouth once a day  -- Indication: For Cardioprotective    lisinopril 10 mg oral tablet  -- 1 tab(s) by mouth once a day  -- Indication: For Hypertension    Eliquis 5 mg oral tablet  -- 1 tab(s) by mouth 2 times a day  -- Indication: For Pulmonary embolism     mirtazapine 30 mg oral tablet  -- 1 tab(s) by mouth once a day (at bedtime)  -- Indication: For Depression    citalopram 40 mg oral tablet  -- 1 tab(s) by mouth once a day  -- Indication: For Depression    gemfibrozil 600 mg oral tablet  -- 1 tab(s) by mouth 2 times a day (before meals)  -- Indication: For Hypertriglyceredemia     atorvastatin 40 mg oral tablet  -- 1 tab(s) by mouth once a day (at bedtime)  -- Indication: For Hyperlipidemia    OLANZapine 5 mg oral tablet  -- 1 tab(s) by mouth once a day (at bedtime)  -- Indication: For Depression    busPIRone 15 mg oral tablet  -- 1 tab(s) by mouth 2 times a day  -- Indication: For Anxiety    Synthroid 25 mcg (0.025 mg) oral tablet  -- 1 tab(s) by mouth once a day  -- Indication: For Hypothyroidism    Vitamin D2 50,000 intl units (1.25 mg) oral capsule  -- 1 cap(s) by mouth once a week  -- Indication: For vitamin d defeciency

## 2017-06-01 NOTE — DISCHARGE NOTE ADULT - PLAN OF CARE
prevent the further attacks You admitted for the concern of having stroke, monitored on telemetry, no arrythmias noted, MRI negative for any stroke, evaluated by neurologist, EEG was also done for seizures, negative, you were seen by PT , able to go home, you need to continue with aspirin, statin , and also lopid as your Triglycerides level in blood was elevated, you need to follow up with your PMD in 2 weeks provide supportive care Your home medications celexa, buspirone, citalopram, mirtazipine ontinued, you need to follow up with your psychiatrist outpatient. keep the BP around 140/90 Initially for the concern of stroke BP meds kept on hold due permisssive HTN, later restarted on home med lisinopril, BP running running normally, take < 2 gm salt diet prevent the stroke Continue taking the eliquis 5 mg BID keep the TAGs levels< 100 your TAGS levels elevated to 335, started on lopid 600 mg BID, need to continue that, take low fat diet, repeat in 6 months keep the level of TSH wnl Your home med synthyroid continued , TSH levels was WNL. your level was low 18.3 , given the weely supplements , recheck the levels in 4-6 weeks, if > 30 can continue with 1000 units daily. You admitted for the concern of having stroke, monitored on telemetry, no arrhythmias noted, MRI negative for any stroke, evaluated by neurologist Dr. Escalante, EEG was also done for seizures, negative, you were seen by PT, able to go home, you need to continue with aspirin, statin , and also Lopid as your Triglycerides level in blood was elevated, you need to follow up with your PMD in 2 weeks. Your home medications celexa, buspirone, citalopram, mirtazipine continued, you need to follow up with your psychiatrist outpatient. Continue taking the Eliquis 5 mg BID indefinitely given recurrent embolism. keep the Triglycerides levels< 100 your TAGS levels elevated to 335, started on Lopid 600 mg BID, need to continue that, take low fat diet, repeat in 6 months Your home med                 Synthyroid continued , TSH levels was WNL. your level was low 18.3 , given the weely supplements , recheck the levels in 4-6 weeks, if > 30 can switch to 1000 units daily.

## 2017-06-01 NOTE — DISCHARGE NOTE ADULT - MEDICATION SUMMARY - MEDICATIONS TO STOP TAKING
I will STOP taking the medications listed below when I get home from the hospital:    benztropine 0.5 mg oral tablet  -- 1 tab(s) by mouth once a day (at bedtime)    fenofibrate 145 mg oral tablet  -- 1 tab(s) by mouth once a day    apixaban 5 mg oral tablet  -- 2 tab(s) by mouth 2 times a day

## 2017-06-01 NOTE — DISCHARGE NOTE ADULT - CARE PLAN
Principal Discharge DX:	TIA (transient ischemic attack)  Goal:	prevent the further attacks  Instructions for follow-up, activity and diet:	You admitted for the concern of having stroke, monitored on telemetry, no arrythmias noted, MRI negative for any stroke, evaluated by neurologist, EEG was also done for seizures, negative, you were seen by PT , able to go home, you need to continue with aspirin, statin , and also lopid as your Triglycerides level in blood was elevated, you need to follow up with your PMD in 2 weeks  Secondary Diagnosis:	Depression  Goal:	provide supportive care  Instructions for follow-up, activity and diet:	Your home medications celexa, buspirone, citalopram, mirtazipine ontinued, you need to follow up with your psychiatrist outpatient.  Secondary Diagnosis:	Essential hypertension  Goal:	keep the BP around 140/90  Instructions for follow-up, activity and diet:	Initially for the concern of stroke BP meds kept on hold due permisssive HTN, later restarted on home med lisinopril, BP running running normally, take < 2 gm salt diet  Secondary Diagnosis:	Pulmonary embolism  Goal:	prevent the stroke  Instructions for follow-up, activity and diet:	Continue taking the eliquis 5 mg BID  Secondary Diagnosis:	Hypertriglyceridemia  Goal:	keep the TAGs levels< 100  Instructions for follow-up, activity and diet:	your TAGS levels elevated to 335, started on lopid 600 mg BID, need to continue that, take low fat diet, repeat in 6 months  Secondary Diagnosis:	Hypothyroidism  Goal:	keep the level of TSH wnl  Instructions for follow-up, activity and diet:	Your home med synthyroid continued , TSH levels was WNL.  Secondary Diagnosis:	Vitamin D deficiency  Instructions for follow-up, activity and diet:	your level was low 18.3 , given the weely supplements , recheck the levels in 4-6 weeks, if > 30 can continue with 1000 units daily. Principal Discharge DX:	TIA (transient ischemic attack)  Goal:	prevent the further attacks  Instructions for follow-up, activity and diet:	You admitted for the concern of having stroke, monitored on telemetry, no arrhythmias noted, MRI negative for any stroke, evaluated by neurologist Dr. Escalante, EEG was also done for seizures, negative, you were seen by PT, able to go home, you need to continue with aspirin, statin , and also Lopid as your Triglycerides level in blood was elevated, you need to follow up with your PMD in 2 weeks.  Secondary Diagnosis:	Depression  Goal:	provide supportive care  Instructions for follow-up, activity and diet:	Your home medications celexa, buspirone, citalopram, mirtazipine continued, you need to follow up with your psychiatrist outpatient.  Secondary Diagnosis:	Essential hypertension  Goal:	keep the BP around 140/90  Instructions for follow-up, activity and diet:	Initially for the concern of stroke BP meds kept on hold due permisssive HTN, later restarted on home med lisinopril, BP running running normally, take < 2 gm salt diet  Secondary Diagnosis:	Pulmonary embolism  Goal:	prevent the stroke  Instructions for follow-up, activity and diet:	Continue taking the Eliquis 5 mg BID indefinitely given recurrent embolism.  Secondary Diagnosis:	Hypertriglyceridemia  Goal:	keep the Triglycerides levels< 100  Instructions for follow-up, activity and diet:	your TAGS levels elevated to 335, started on Lopid 600 mg BID, need to continue that, take low fat diet, repeat in 6 months  Secondary Diagnosis:	Hypothyroidism  Goal:	keep the level of TSH wnl  Instructions for follow-up, activity and diet:	Your home med                 Synthyroid continued , TSH levels was WNL.  Secondary Diagnosis:	Vitamin D deficiency  Instructions for follow-up, activity and diet:	your level was low 18.3 , given the weely supplements , recheck the levels in 4-6 weeks, if > 30 can switch to 1000 units daily.

## 2017-06-01 NOTE — DISCHARGE NOTE ADULT - PATIENT PORTAL LINK FT
“You can access the [facility] Patient Portal, offered by [facility], by registering with the following website: [email or web address]”

## 2017-06-01 NOTE — DISCHARGE NOTE ADULT - SECONDARY DIAGNOSIS.
Depression Essential hypertension Pulmonary embolism Hypertriglyceridemia Hypothyroidism Vitamin D deficiency

## 2017-06-01 NOTE — DISCHARGE NOTE ADULT - NS AS DC STROKE ED MATERIALS
Stroke Warning Signs and Symptoms/Call 911 for Stroke/Stroke Education Booklet/Risk Factors for Stroke/Need for Followup After Discharge/Prescribed Medications

## 2017-06-05 DIAGNOSIS — Z79.82 LONG TERM (CURRENT) USE OF ASPIRIN: ICD-10-CM

## 2017-06-05 DIAGNOSIS — Z86.73 PERSONAL HISTORY OF TRANSIENT ISCHEMIC ATTACK (TIA), AND CEREBRAL INFARCTION WITHOUT RESIDUAL DEFICITS: ICD-10-CM

## 2017-06-05 DIAGNOSIS — F41.8 OTHER SPECIFIED ANXIETY DISORDERS: ICD-10-CM

## 2017-06-05 DIAGNOSIS — M79.669 PAIN IN UNSPECIFIED LOWER LEG: ICD-10-CM

## 2017-06-05 DIAGNOSIS — I10 ESSENTIAL (PRIMARY) HYPERTENSION: ICD-10-CM

## 2017-06-05 DIAGNOSIS — E55.9 VITAMIN D DEFICIENCY, UNSPECIFIED: ICD-10-CM

## 2017-06-05 DIAGNOSIS — G45.9 TRANSIENT CEREBRAL ISCHEMIC ATTACK, UNSPECIFIED: ICD-10-CM

## 2017-06-05 DIAGNOSIS — E78.1 PURE HYPERGLYCERIDEMIA: ICD-10-CM

## 2017-06-05 DIAGNOSIS — Z86.711 PERSONAL HISTORY OF PULMONARY EMBOLISM: ICD-10-CM

## 2017-06-05 DIAGNOSIS — J02.9 ACUTE PHARYNGITIS, UNSPECIFIED: ICD-10-CM

## 2017-06-05 DIAGNOSIS — E03.9 HYPOTHYROIDISM, UNSPECIFIED: ICD-10-CM

## 2017-06-05 DIAGNOSIS — J45.909 UNSPECIFIED ASTHMA, UNCOMPLICATED: ICD-10-CM

## 2020-01-30 NOTE — PATIENT PROFILE ADULT. - PROVIDER NOTIFICATION
Cira is contacted regarding refill of pantoprozole. She states she can receive this for free through [facility].  Had last been filled on 1-3-20 for #90 with 4 refills at [facility].  Rx is sent to [facility] for #90 with 3 refills per [facility]. protocol. She will receive the Celebrex through the [facility].  
Information could not be obtained

## 2024-10-16 NOTE — PHYSICAL THERAPY INITIAL EVALUATION ADULT - PLANNED THERAPY INTERVENTIONS, PT EVAL
Render In Strict Bullet Format?: No
Detail Level: Simple
Initiate Treatment: TAC ointment BID prn.  CeraVe SA cream after showering.
Continue Regimen: Dermasmoothe oil to scalp, leaving in over night and rinsing QAM.   T-sal shampoo.
Plan: Will attempt authorization for either Rachelle or Skyrizi pending Quant gold results.
stair training/gait training